# Patient Record
Sex: MALE | Race: WHITE | NOT HISPANIC OR LATINO | Employment: UNEMPLOYED | URBAN - METROPOLITAN AREA
[De-identification: names, ages, dates, MRNs, and addresses within clinical notes are randomized per-mention and may not be internally consistent; named-entity substitution may affect disease eponyms.]

---

## 2019-11-02 ENCOUNTER — CLINICAL SUPPORT (OUTPATIENT)
Dept: URGENT CARE | Facility: CLINIC | Age: 49
End: 2019-11-02
Payer: MEDICAID

## 2019-11-02 VITALS
TEMPERATURE: 99 F | RESPIRATION RATE: 16 BRPM | DIASTOLIC BLOOD PRESSURE: 78 MMHG | HEART RATE: 117 BPM | SYSTOLIC BLOOD PRESSURE: 112 MMHG | WEIGHT: 180 LBS | HEIGHT: 71 IN | BODY MASS INDEX: 25.2 KG/M2

## 2019-11-02 DIAGNOSIS — T78.40XA ALLERGIC REACTION, INITIAL ENCOUNTER: ICD-10-CM

## 2019-11-02 DIAGNOSIS — I10 HYPERTENSION, UNSPECIFIED TYPE: ICD-10-CM

## 2019-11-02 DIAGNOSIS — L03.213 PERIORBITAL CELLULITIS OF LEFT EYE: Primary | ICD-10-CM

## 2019-11-02 PROCEDURE — 99204 PR OFFICE/OUTPT VISIT, NEW, LEVL IV, 45-59 MIN: ICD-10-PCS | Mod: S$GLB,,, | Performed by: NURSE PRACTITIONER

## 2019-11-02 PROCEDURE — 99204 OFFICE O/P NEW MOD 45 MIN: CPT | Mod: S$GLB,,, | Performed by: NURSE PRACTITIONER

## 2019-11-02 RX ORDER — HYDROCHLOROTHIAZIDE 12.5 MG/1
12.5 CAPSULE ORAL DAILY
Qty: 30 CAPSULE | Refills: 1 | Status: SHIPPED | OUTPATIENT
Start: 2019-11-02 | End: 2023-01-11

## 2019-11-02 RX ORDER — CLINDAMYCIN HYDROCHLORIDE 300 MG/1
300 CAPSULE ORAL EVERY 8 HOURS
Qty: 30 CAPSULE | Refills: 0 | Status: SHIPPED | OUTPATIENT
Start: 2019-11-02 | End: 2019-11-12

## 2019-11-02 RX ORDER — CALCIPOTRIENE 50 UG/G
CREAM TOPICAL
COMMUNITY
Start: 2019-02-04

## 2019-11-02 RX ORDER — CITALOPRAM 40 MG/1
1 TABLET, FILM COATED ORAL DAILY
Status: ON HOLD | COMMUNITY
End: 2021-10-28 | Stop reason: HOSPADM

## 2019-11-02 RX ORDER — ALBUTEROL SULFATE 90 UG/1
AEROSOL, METERED RESPIRATORY (INHALATION)
COMMUNITY
Start: 2019-01-25

## 2019-11-02 RX ORDER — CLOPIDOGREL BISULFATE 75 MG/1
1 TABLET ORAL DAILY
COMMUNITY
Start: 2018-10-23

## 2019-11-02 RX ORDER — LEVETIRACETAM 1000 MG/1
2 TABLET ORAL 2 TIMES DAILY
Status: ON HOLD | COMMUNITY
Start: 2018-10-23 | End: 2021-10-28 | Stop reason: HOSPADM

## 2019-11-02 RX ORDER — CLOBETASOL PROPIONATE 0.5 MG/G
OINTMENT TOPICAL
COMMUNITY
Start: 2019-09-24

## 2019-11-02 RX ORDER — AMLODIPINE BESYLATE 10 MG/1
1 TABLET ORAL DAILY
Status: ON HOLD | COMMUNITY
End: 2021-10-28 | Stop reason: HOSPADM

## 2019-11-02 RX ORDER — ASPIRIN 81 MG/1
1 TABLET ORAL DAILY
Status: ON HOLD | COMMUNITY
Start: 2018-10-23 | End: 2021-10-28 | Stop reason: HOSPADM

## 2019-11-02 RX ORDER — LISINOPRIL 20 MG/1
1 TABLET ORAL DAILY
COMMUNITY

## 2019-11-02 RX ORDER — LORATADINE 10 MG/1
1 TABLET ORAL DAILY
COMMUNITY

## 2019-11-02 RX ORDER — DEXAMETHASONE SODIUM PHOSPHATE 4 MG/ML
8 INJECTION, SOLUTION INTRA-ARTICULAR; INTRALESIONAL; INTRAMUSCULAR; INTRAVENOUS; SOFT TISSUE
Status: COMPLETED | OUTPATIENT
Start: 2019-11-02 | End: 2019-11-02

## 2019-11-02 RX ORDER — ATORVASTATIN CALCIUM 80 MG/1
1 TABLET, FILM COATED ORAL DAILY
Status: ON HOLD | COMMUNITY
Start: 2018-10-23 | End: 2021-10-28 | Stop reason: HOSPADM

## 2019-11-02 RX ORDER — METFORMIN HYDROCHLORIDE 500 MG/1
2 TABLET ORAL 2 TIMES DAILY
Status: ON HOLD | COMMUNITY
End: 2021-10-28 | Stop reason: HOSPADM

## 2019-11-02 RX ORDER — PREDNISONE 20 MG/1
20 TABLET ORAL 2 TIMES DAILY
Qty: 10 TABLET | Refills: 0 | Status: SHIPPED | OUTPATIENT
Start: 2019-11-02 | End: 2019-11-07

## 2019-11-02 RX ADMIN — DEXAMETHASONE SODIUM PHOSPHATE 8 MG: 4 INJECTION, SOLUTION INTRA-ARTICULAR; INTRALESIONAL; INTRAMUSCULAR; INTRAVENOUS; SOFT TISSUE at 12:11

## 2019-11-02 NOTE — PATIENT INSTRUCTIONS
Angioedema  Angioedema (UJ-cbj-kh-eh-ALONZO-muh) is a sudden appearance of swollen patches (edema) on the skin or mucous membranes. It most often involves the face, lips, mouth, tongue, back of throat, or vocal cords. It may also occur in other places, such as the arms or legs. A rash may also appear during the first 4 days of this illness.  There are different types of angioedema. Your symptoms will depend on what type of angioedema you have. Swelling and redness may be the main symptoms. Like allergic reactions, angioedema may include:  · Rash, hives, redness, welts, blisters  · Itching, burning, stinging, pain  · Dry, flaky, cracking, or scaly skin  · Swelling of the face, lips, tongue, or other parts of the body  More severe symptoms may include:  · Trouble swallowing, or feeling like your throat is closing  · Trouble breathing or wheezing  · Hoarse voice or trouble speaking  · Nausea, vomiting, diarrhea, or stomach cramps  · Feeling faint or lightheaded, rapid heart rate, or low blood pressure  Angioedema can be triggered by exposure to certain substances. Medical conditions involving the immune systems and certain infections may cause it. In rare cases, angioedema can be hereditary. Sometimes the cause may be very clear. However, it is often hard to find a cause. The most common causes include:  · Foods, such as shrimp, shellfish, peanuts, milk products, gluten, and eggs; also colorings, flavorings, and additives  · Insect bites or stings, from bees, mosquitos, fleas, or ticks  · Medicines, such as ACE inhibitors, penicillin, sulfa drugs, amoxicillin, aspirin, and ibuprofen  · Latex, which may be in gloves, clothes, toys, balloons, and some kinds of tape. People who are allergic to latex may have problems with foods such as bananas, avocados, kiwi, papaya, or chestnuts.  · Stress  · Heat, cold, or sunlight  The most common cause of angioedema is a reaction to a class of medicines called ACE inhibitors. These  are used to treat high blood pressure. ACE inhibitors include captopril, enalapril, and lisinopril. Angiodema can happen even after you have been taking the medicine for some time. Tell your doctor if you have angioedema symptoms and are taking any of these medicines. Angioedema may recur. It is important to watch for the earliest signs of this condition (see the list below). Contact your healthcare provider right away if swelling involves the face, mouth, or throat.  Home care  Rest quietly today. Avoid vigorous physical activity.  Medicines: The healthcare provider may prescribe medicines for itching, swelling, or pain. Follow the healthcare providers instructions when taking these medicines.  · Oral diphenhydramine is an antihistamine available without a prescription. Unless a prescription antihistamine was given, diphenhydramine may be used to reduce widespread itching. It may make you sleepy, so be careful using it when going to school, working, or driving. (Note: Do not use diphenhydramine if you have glaucoma or if you are a man who has trouble urinating due to an enlarged prostate.) Loratadine is an antihistamine that may cause less drowsiness.  · Do not use diphenhydramine cream on your skin. Some people can have an allergic reaction to this.  · Calamine lotion or oatmeal baths sometimes help with itching.  · You may use acetaminophen or ibuprofen for pain, unless another pain medicine was prescribed.  · If you were told that your angioedema was caused by a medicine you are taking, you must stop taking it. Ask your healthcare provider for a different one. In the future, advise medical staff that you are allergic to this medicine.  · If medicine was prescribed, such as steroids or antihistamines, be sure you understand what the medicine is and how to take it.   General care  · Make sure you do not scratch areas of the body that had a reaction. This will help prevent infection.   · Stay away from air  pollution, tobacco, and wood smoke. Also stay away from cold temperatures. These things can make allergy symptoms worse.  · Try to find out what cause your reaction. Make sure to remove the allergen. Future reactions may be worse.   · If you have a serious allergy, wear a medical alert bracelet that notes this allergy.  · If the healthcare provider prescribed an epinephrine auto injector kit, keep it with you at all times.   · Tell all care providers about your allergy. Ask them h ow to use any prescribed medicines.  · Keep a record of allergies and symptoms, and when they occurred. This will help your provider treat you over time.   Follow-up care  Follow up with your healthcare provider, or as advised. You may need to see an allergist. An allergist can help find the cause of an allergic reaction and give recommendations on how to prevent future reactions.  Call 911  Contact emergency services right away if any of these occur:  · Trouble breathing or swallowing, or wheezing  · Hoarse voice or trouble speaking, or drooling  · Chest pain or tightness  · Confusion, lightheadedness, or dizziness  · Extreme drowsiness or trouble awakening  · Fainting or loss of consciousness  · Rapid heart rate  · Vomiting blood, or large amounts of blood in stool  · Seizure  · Nausea, vomiting, diarrhea, abdominal pain, or stomach cramps  When to seek medical attention  Call your healthcare provider right away if any of the following occur:  · Symptoms don't go away  · Symptoms come back  · Symptoms get worse or new symptoms develop  · Hives feel uncomfortable  · Fever of 100.4°F (38°C), or as directed by your healthcare professional  Date Last Reviewed: 5/1/2017  © 7051-4073 [employer]. [address] [place] [address], [place] [address]. All rights reserved. This information is not intended as a substitute for professional medical care. Always follow your healthcare professional's instructions.        Facial  Cellulitis  Cellulitis is an infection of the deep layers of skin. A break in the skin, such as a cut or scratch, can let bacteria under the skin. It may also occur from an infected oil gland (pimple) or hair follicle. If the bacteria get to deep layers of the skin, it can be serious. If not treated, cellulitis can get into the bloodstream and lymph nodes. The infection can then spread throughout the body. This causes serious illness.  Cellulitis causes the affected skin to become red, swollen, warm, and sore. The reddened areas have a visible border. You may have a fever, chills, and pain.  Cellulitis is treated with antibiotics taken for 7 to 10 days. Symptoms should get better 1 to 2 days after treatment is started. Make sure to take all the antibiotics for the full number of days until they are gone. Keep taking the medication even if your symptoms go away.  Home care  Follow these tips:  · Take all of the antibiotic medicine exactly as directed until it is gone. Dont miss any doses, especially during the first 7 days. Dont stop taking it when your symptoms get better.  · Use a cool compress (face cloth soaked in cool water) on your face to help reduce swelling and pain.  · You may use acetaminophen or ibuprofen to reduce pain. Dont use these if you have chronic liver or kidney disease, or ever had a stomach ulcer or gastrointestinal bleeding. Talk with your healthcare provider first.  Follow-up care  Follow up with your healthcare provider, or as advised. If your infection does not go away on the first antibiotic, your healthcare provider will prescribe a different one.  When to seek medical advice  Call your healthcare provider right away if any of these occur:  · Fever higher of 100.4º F (38.0º C) or higher after 2 days on antibiotics  · Red areas that spread  · Swelling or pain that gets worse  · Fluid leaking from the skin (pus)  · An eyelid that swells shut or leaks fluid (pus)  · Headache or neck pain  that gets worse  · Unusual drowsiness or confusion  · Convulsions (seizure)  · Change in eyesight     Date Last Reviewed: 9/1/2016  © 3290-4741 [employer]. [address] [place] [address], [place] [address]. All rights reserved. This information is not intended as a substitute for professional medical care. Always follow your healthcare professional's instructions.      STOP TAKING LISINOPRIL

## 2019-11-02 NOTE — PROGRESS NOTES
"Subjective:       Patient ID: Brannon Severino is a 49 y.o. male.    Vitals:  height is 5' 11" (1.803 m) and weight is 81.6 kg (180 lb). His oral temperature is 98.8 °F (37.1 °C). His blood pressure is 112/78 and his pulse is 117 (abnormal). His respiration is 16.     Chief Complaint: Belepharitis (under left eye swollen X 2 days, denies any trauma, visual changes, hurts some if you touch )    Mr. Severino presents today with complaints of left periorbital swelling. It is moderate. It is associated with itching. He denies pain, visual disturbance, or known contacts with allergens. He does take lisinopril.      Constitution: Negative for chills and fever.   HENT: Negative for congestion and sinus pain.    Eyes: Positive for eyelid swelling. Negative for eye trauma, foreign body in eye, eye discharge, eye itching, eye pain, eye redness, photophobia, vision loss, double vision and blurred vision.   Gastrointestinal: Negative for nausea and vomiting.   Skin: Negative for rash.   Allergic/Immunologic: Negative for seasonal allergies and itching.   Neurological: Negative for headaches.       Objective:      Physical Exam   Constitutional: He is oriented to person, place, and time. He appears well-developed and well-nourished.   HENT:   Head: Normocephalic and atraumatic.   Right Ear: External ear normal.   Left Ear: External ear normal.   Nose: Nose normal.   Mouth/Throat: Oropharynx is clear and moist.   Eyes: Pupils are equal, round, and reactive to light. Conjunctivae, EOM and lids are normal.   Left eyelid slight swelling under eye with significant swelling   Neck: Trachea normal, full passive range of motion without pain and phonation normal. Neck supple.   Musculoskeletal: Normal range of motion.   Neurological: He is alert and oriented to person, place, and time.   Skin: Skin is warm, dry and intact. Capillary refill takes less than 2 seconds.   Psychiatric: He has a normal mood and affect. His speech is normal " and behavior is normal. Judgment and thought content normal. Cognition and memory are normal.   Nursing note and vitals reviewed.        Assessment:       1. Periorbital cellulitis of left eye    2. Hypertension, unspecified type    3. Allergic reaction, initial encounter        Plan:         Periorbital cellulitis of left eye  -     clindamycin (CLEOCIN) 300 MG capsule; Take 1 capsule (300 mg total) by mouth every 8 (eight) hours. for 10 days  Dispense: 30 capsule; Refill: 0    Hypertension, unspecified type  -     hydroCHLOROthiazide (MICROZIDE) 12.5 mg capsule; Take 1 capsule (12.5 mg total) by mouth once daily.  Dispense: 30 capsule; Refill: 1    Allergic reaction, initial encounter  -     dexamethasone injection 8 mg  -     predniSONE (DELTASONE) 20 MG tablet; Take 1 tablet (20 mg total) by mouth 2 (two) times daily. for 5 days  Dispense: 10 tablet; Refill: 0       He should stop taking lisinopril until he follows up with his PCP

## 2020-09-25 ENCOUNTER — TELEPHONE (OUTPATIENT)
Dept: OPTOMETRY | Facility: CLINIC | Age: 50
End: 2020-09-25

## 2020-09-25 NOTE — TELEPHONE ENCOUNTER
----- Message from Aleida Gonzalez sent at 9/24/2020  6:08 PM CDT -----  Regarding: Appointment request  Appointment  Request      Who called:Patient's sister Suze Patel  Reason for visit:eye exam  New or Existing Patient:new  Callback or Josechannalee response:callback  Best contact number:[phone]  Additional information:has medicaid

## 2020-09-25 NOTE — TELEPHONE ENCOUNTER
LVM for pt that we are out of network for vision exams under Medicaid insurance.  Pt to call back if would still like internal dilated exam only.

## 2020-11-10 ENCOUNTER — OFFICE VISIT (OUTPATIENT)
Dept: URGENT CARE | Facility: CLINIC | Age: 50
End: 2020-11-10
Payer: MEDICAID

## 2020-11-10 VITALS
SYSTOLIC BLOOD PRESSURE: 116 MMHG | OXYGEN SATURATION: 98 % | WEIGHT: 162 LBS | HEART RATE: 99 BPM | HEIGHT: 71 IN | TEMPERATURE: 98 F | DIASTOLIC BLOOD PRESSURE: 83 MMHG | RESPIRATION RATE: 18 BRPM | BODY MASS INDEX: 22.68 KG/M2

## 2020-11-10 DIAGNOSIS — T78.3XXA ANGIOEDEMA OF LIPS, INITIAL ENCOUNTER: Primary | ICD-10-CM

## 2020-11-10 DIAGNOSIS — E11.9 TYPE 2 DIABETES MELLITUS WITHOUT COMPLICATION, WITHOUT LONG-TERM CURRENT USE OF INSULIN: ICD-10-CM

## 2020-11-10 DIAGNOSIS — T78.40XA ALLERGIC REACTION TO DRUG, INITIAL ENCOUNTER: ICD-10-CM

## 2020-11-10 LAB — GLUCOSE SERPL-MCNC: 90 MG/DL (ref 70–110)

## 2020-11-10 PROCEDURE — 99214 PR OFFICE/OUTPT VISIT, EST, LEVL IV, 30-39 MIN: ICD-10-PCS | Mod: S$GLB,,, | Performed by: NURSE PRACTITIONER

## 2020-11-10 PROCEDURE — 99214 OFFICE O/P EST MOD 30 MIN: CPT | Mod: S$GLB,,, | Performed by: NURSE PRACTITIONER

## 2020-11-10 PROCEDURE — 82962 GLUCOSE BLOOD TEST: CPT | Mod: ,,, | Performed by: NURSE PRACTITIONER

## 2020-11-10 PROCEDURE — 82962 POCT GLUCOSE, HAND-HELD DEVICE: ICD-10-PCS | Mod: ,,, | Performed by: NURSE PRACTITIONER

## 2020-11-10 RX ORDER — FAMOTIDINE 20 MG/1
20 TABLET, FILM COATED ORAL 2 TIMES DAILY
Qty: 20 TABLET | Refills: 0 | Status: SHIPPED | OUTPATIENT
Start: 2020-11-10 | End: 2023-01-11

## 2020-11-10 RX ORDER — PREDNISONE 20 MG/1
40 TABLET ORAL DAILY
Qty: 10 TABLET | Refills: 0 | Status: SHIPPED | OUTPATIENT
Start: 2020-11-10 | End: 2020-11-15

## 2020-11-10 RX ORDER — DIPHENHYDRAMINE HYDROCHLORIDE 50 MG/ML
25 INJECTION INTRAMUSCULAR; INTRAVENOUS
Status: COMPLETED | OUTPATIENT
Start: 2020-11-10 | End: 2020-11-10

## 2020-11-10 RX ORDER — DEXAMETHASONE SODIUM PHOSPHATE 4 MG/ML
8 INJECTION, SOLUTION INTRA-ARTICULAR; INTRALESIONAL; INTRAMUSCULAR; INTRAVENOUS; SOFT TISSUE
Status: COMPLETED | OUTPATIENT
Start: 2020-11-10 | End: 2020-11-10

## 2020-11-10 RX ORDER — DIPHENHYDRAMINE HCL 50 MG
50 CAPSULE ORAL EVERY 6 HOURS PRN
Qty: 30 CAPSULE | Refills: 0 | Status: SHIPPED | OUTPATIENT
Start: 2020-11-10

## 2020-11-10 RX ORDER — DIPHENHYDRAMINE HCL 25 MG
25 TABLET ORAL
Status: DISCONTINUED | OUTPATIENT
Start: 2020-11-10 | End: 2020-11-10

## 2020-11-10 RX ADMIN — DEXAMETHASONE SODIUM PHOSPHATE 8 MG: 4 INJECTION, SOLUTION INTRA-ARTICULAR; INTRALESIONAL; INTRAMUSCULAR; INTRAVENOUS; SOFT TISSUE at 10:11

## 2020-11-10 RX ADMIN — DIPHENHYDRAMINE HYDROCHLORIDE 25 MG: 50 INJECTION INTRAMUSCULAR; INTRAVENOUS at 10:11

## 2020-11-10 NOTE — PROGRESS NOTES
"Subjective:       Patient ID: Brannon Severino is a 50 y.o. male.    Vitals:  height is 5' 11" (1.803 m) and weight is 73.5 kg (162 lb). His oral temperature is 97.6 °F (36.4 °C). His blood pressure is 116/83 and his pulse is 99. His respiration is 18 and oxygen saturation is 98%.     Chief Complaint: Oral Swelling    Patient reports he woke up with swelling to his upper lip. Denies sob, difficulty breathing/swallowing, wheezing. Patient is taking Lisinopril.       Constitution: Negative for chills, fatigue and fever.   HENT: Negative for congestion and sore throat.         Upper lip swelling   Neck: Negative for painful lymph nodes.   Cardiovascular: Negative for chest pain and leg swelling.   Eyes: Negative for double vision and blurred vision.   Respiratory: Negative for cough and shortness of breath.    Gastrointestinal: Negative for nausea, vomiting and diarrhea.   Genitourinary: Negative for dysuria, frequency and urgency.   Musculoskeletal: Negative for joint pain, joint swelling, muscle cramps and muscle ache.   Skin: Negative for color change, pale and rash.   Allergic/Immunologic: Negative for seasonal allergies.   Neurological: Negative for dizziness, history of vertigo, light-headedness, passing out and headaches.   Hematologic/Lymphatic: Negative for swollen lymph nodes, easy bruising/bleeding and history of blood clots. Does not bruise/bleed easily.   Psychiatric/Behavioral: Negative for nervous/anxious, sleep disturbance and depression. The patient is not nervous/anxious.        Objective:      Physical Exam   Constitutional: He is oriented to person, place, and time. He appears well-developed. He is cooperative.  Non-toxic appearance. He does not appear ill. No distress.   HENT:   Head: Normocephalic and atraumatic.   Ears:   Right Ear: Hearing, tympanic membrane, external ear and ear canal normal.   Left Ear: Hearing, tympanic membrane, external ear and ear canal normal.   Nose: Nose normal. No " mucosal edema, rhinorrhea or nasal deformity. No epistaxis. Right sinus exhibits no maxillary sinus tenderness and no frontal sinus tenderness. Left sinus exhibits no maxillary sinus tenderness and no frontal sinus tenderness.   Mouth/Throat: Uvula is midline, oropharynx is clear and moist and mucous membranes are normal. No trismus in the jaw. Normal dentition. No uvula swelling. No posterior oropharyngeal erythema.   Severe upper lip swelling. Airway patent. No drooling. Speaking in clear sentences. No difficulty speaking or swallowing.       Comments: Severe upper lip swelling. Airway patent. No drooling. Speaking in clear sentences. No difficulty speaking or swallowing.   Eyes: Conjunctivae and lids are normal. Right eye exhibits no discharge. Left eye exhibits no discharge. No scleral icterus.   Neck: Trachea normal, normal range of motion, full passive range of motion without pain and phonation normal. Neck supple.   Cardiovascular: Normal rate, regular rhythm, normal heart sounds and normal pulses.   Pulmonary/Chest: Effort normal and breath sounds normal. No stridor. No respiratory distress. He has no decreased breath sounds. He has no wheezes. He has no rhonchi. He has no rales.   Abdominal: Soft. Normal appearance and bowel sounds are normal. He exhibits no distension, no pulsatile midline mass and no mass. There is no abdominal tenderness.   Musculoskeletal: Normal range of motion.         General: No deformity.   Neurological: He is alert and oriented to person, place, and time. He exhibits normal muscle tone. Coordination normal.   Skin: Skin is warm, dry, intact, not diaphoretic and not pale. Psychiatric: His speech is normal and behavior is normal. Judgment and thought content normal.   Nursing note and vitals reviewed.        Assessment:       1. Angioedema of lips, initial encounter    2. Allergic reaction to drug, initial encounter    3. Type 2 diabetes mellitus without complication, without  long-term current use of insulin        Plan:       CBG = 90    Decadron 8mg and Benadryl 25 mg IM given in clinic.      Advised patient :   STOP TAKING LISINOPRIL!! If you begin having difficulty breathing, swallowing, hoarse voice, drooling, trouble speaking, wheezing, shortness of breath or any other symptoms go immediately to the ER. Monitor your blood sugar closely for the next few days since you received a steroid injection today and steroids can cause your blood sugar to go up. Follow up with PCP and let him know that you are no longer taking Lisinopril       Angioedema of lips, initial encounter    Allergic reaction to drug, initial encounter    Type 2 diabetes mellitus without complication, without long-term current use of insulin  -     POCT Glucose, Hand-Held Device    Other orders  -     famotidine (PEPCID) 20 MG tablet; Take 1 tablet (20 mg total) by mouth 2 (two) times daily. for 10 days  Dispense: 20 tablet; Refill: 0  -     predniSONE (DELTASONE) 20 MG tablet; Take 2 tablets (40 mg total) by mouth once daily. for 5 days  Dispense: 10 tablet; Refill: 0  -     diphenhydrAMINE (BENADRYL) 50 MG capsule; Take 1 capsule (50 mg total) by mouth every 6 (six) hours as needed for Itching.  Dispense: 30 capsule; Refill: 0  -     diphenhydrAMINE tablet 25 mg  -     dexamethasone injection 8 mg

## 2020-11-10 NOTE — PATIENT INSTRUCTIONS
STOP TAKING LISINOPRIL!! If you begin having difficulty breathing, swallowing, hoarse voice, drooling, trouble speaking, wheezing, shortness of breath or any other symptoms go immediately to the ER.     Angioedema  Angioedema (BN-[id number]-eh-ALONZO-muh) is a sudden appearance of swollen patches (edema) on the skin or mucous membranes. It most often involves the face, lips, mouth, tongue, back of throat, or vocal cords. It may also occur in other places, such as the arms or legs. A rash may also appear during the first 4 days of this illness.  There are different types of angioedema. Your symptoms will depend on what type of angioedema you have. Swelling and redness may be the main symptoms. Like allergic reactions, angioedema may include:  · Rash, hives, redness, welts, blisters  · Itching, burning, stinging, pain  · Dry, flaky, cracking, or scaly skin  · Swelling of the face, lips, tongue, or other parts of the body  More severe symptoms may include:  · Trouble swallowing, or feeling like your throat is closing  · Trouble breathing or wheezing  · Hoarse voice or trouble speaking  · Nausea, vomiting, diarrhea, or stomach cramps  · Feeling faint or lightheaded, rapid heart rate, or low blood pressure  Angioedema can be triggered by exposure to certain substances. Medical conditions involving the immune systems and certain infections may cause it. In rare cases, angioedema can be hereditary. Sometimes the cause may be very clear. However, it is often hard to find a cause. The most common causes include:  · Foods, such as shrimp, shellfish, peanuts, milk products, gluten, and eggs; also colorings, flavorings, and additives  · Insect bites or stings, from bees, mosquitos, fleas, or ticks  · Medicines, such as ACE inhibitors, penicillin, sulfa drugs, amoxicillin, aspirin, and ibuprofen  · Latex, which may be in gloves, clothes, toys, balloons, and some kinds of tape. People who are allergic to latex may have problems with  foods such as bananas, avocados, kiwi, papaya, or chestnuts.  · Stress  · Heat, cold, or sunlight  The most common cause of angioedema is a reaction to a class of medicines called ACE inhibitors. These are used to treat high blood pressure. ACE inhibitors include captopril, enalapril, and lisinopril. Angiodema can happen even after you have been taking the medicine for some time. Tell your doctor if you have angioedema symptoms and are taking any of these medicines. Angioedema may recur. It is important to watch for the earliest signs of this condition (see the list below). Contact your healthcare provider right away if swelling involves the face, mouth, or throat.  Home care  Rest quietly today. Avoid vigorous physical activity.  Medicines: The healthcare provider may prescribe medicines for itching, swelling, or pain. Follow the healthcare providers instructions when taking these medicines.  · Oral diphenhydramine is an antihistamine available without a prescription. Unless a prescription antihistamine was given, diphenhydramine may be used to reduce widespread itching. It may make you sleepy, so be careful using it when going to school, working, or driving. (Note: Do not use diphenhydramine if you have glaucoma or if you are a man who has trouble urinating due to an enlarged prostate.) Loratadine is an antihistamine that may cause less drowsiness.  · Do not use diphenhydramine cream on your skin. Some people can have an allergic reaction to this.  · Calamine lotion or oatmeal baths sometimes help with itching.  · You may use acetaminophen or ibuprofen for pain, unless another pain medicine was prescribed.  · If you were told that your angioedema was caused by a medicine you are taking, you must stop taking it. Ask your healthcare provider for a different one. In the future, advise medical staff that you are allergic to this medicine.  · If medicine was prescribed, such as steroids or antihistamines, be sure you  understand what the medicine is and how to take it.   General care  · Make sure you do not scratch areas of the body that had a reaction. This will help prevent infection.   · Stay away from air pollution, tobacco, and wood smoke. Also stay away from cold temperatures. These things can make allergy symptoms worse.  · Try to find out what cause your reaction. Make sure to remove the allergen. Future reactions may be worse.   · If you have a serious allergy, wear a medical alert bracelet that notes this allergy.  · If the healthcare provider prescribed an epinephrine auto injector kit, keep it with you at all times.   · Tell all care providers about your allergy. Ask them h ow to use any prescribed medicines.  · Keep a record of allergies and symptoms, and when they occurred. This will help your provider treat you over time.   Follow-up care  Follow up with your healthcare provider, or as advised. You may need to see an allergist. An allergist can help find the cause of an allergic reaction and give recommendations on how to prevent future reactions.  Call 911  Contact emergency services right away if any of these occur:  · Trouble breathing or swallowing, or wheezing  · Hoarse voice or trouble speaking, or drooling  · Chest pain or tightness  · Confusion, lightheadedness, or dizziness  · Extreme drowsiness or trouble awakening  · Fainting or loss of consciousness  · Rapid heart rate  · Vomiting blood, or large amounts of blood in stool  · Seizure  · Nausea, vomiting, diarrhea, abdominal pain, or stomach cramps  When to seek medical attention  Call your healthcare provider right away if any of the following occur:  · Symptoms don't go away  · Symptoms come back  · Symptoms get worse or new symptoms develop  · Hives feel uncomfortable  · Fever of 100.4°F (38°C), or as directed by your healthcare professional  Date Last Reviewed: 5/1/2017  © 9063-0058 [employer] [address] [place] [address], [place] [address].  All rights reserved. This information is not intended as a substitute for professional medical care. Always follow your healthcare professional's instructions.

## 2021-05-10 ENCOUNTER — PATIENT MESSAGE (OUTPATIENT)
Dept: RESEARCH | Facility: HOSPITAL | Age: 51
End: 2021-05-10

## 2021-05-20 ENCOUNTER — TELEPHONE (OUTPATIENT)
Dept: SURGERY | Facility: CLINIC | Age: 51
End: 2021-05-20

## 2021-05-21 ENCOUNTER — TELEPHONE (OUTPATIENT)
Dept: SURGERY | Facility: CLINIC | Age: 51
End: 2021-05-21

## 2021-05-21 ENCOUNTER — DOCUMENTATION ONLY (OUTPATIENT)
Dept: SURGERY | Facility: CLINIC | Age: 51
End: 2021-05-21

## 2021-05-21 DIAGNOSIS — Z12.11 SCREENING FOR COLON CANCER: Primary | ICD-10-CM

## 2021-05-21 RX ORDER — SODIUM CHLORIDE 0.9 % (FLUSH) 0.9 %
3 SYRINGE (ML) INJECTION
Status: CANCELLED | OUTPATIENT
Start: 2021-05-21

## 2021-05-21 RX ORDER — SODIUM, POTASSIUM,MAG SULFATES 17.5-3.13G
1 SOLUTION, RECONSTITUTED, ORAL ORAL DAILY
Qty: 1 KIT | Refills: 0 | Status: SHIPPED | OUTPATIENT
Start: 2021-05-21 | End: 2021-05-23

## 2021-05-21 RX ORDER — SODIUM CHLORIDE, SODIUM LACTATE, POTASSIUM CHLORIDE, CALCIUM CHLORIDE 600; 310; 30; 20 MG/100ML; MG/100ML; MG/100ML; MG/100ML
INJECTION, SOLUTION INTRAVENOUS CONTINUOUS
Status: CANCELLED | OUTPATIENT
Start: 2021-05-21

## 2021-05-24 ENCOUNTER — TELEPHONE (OUTPATIENT)
Dept: SURGERY | Facility: CLINIC | Age: 51
End: 2021-05-24

## 2021-05-27 ENCOUNTER — HOSPITAL ENCOUNTER (EMERGENCY)
Facility: HOSPITAL | Age: 51
Discharge: PSYCHIATRIC OR SUBSTANCE USE FACILITY | End: 2021-05-28
Attending: EMERGENCY MEDICINE
Payer: MEDICAID

## 2021-05-27 DIAGNOSIS — R45.1 AGITATION: Primary | ICD-10-CM

## 2021-05-27 LAB
ALBUMIN SERPL BCP-MCNC: 4.1 G/DL (ref 3.5–5.2)
ALP SERPL-CCNC: 143 U/L (ref 55–135)
ALT SERPL W/O P-5'-P-CCNC: 13 U/L (ref 10–44)
AMPHET+METHAMPHET UR QL: NEGATIVE
ANION GAP SERPL CALC-SCNC: 14 MMOL/L (ref 8–16)
APAP SERPL-MCNC: <3 UG/ML (ref 10–20)
AST SERPL-CCNC: 25 U/L (ref 10–40)
BARBITURATES UR QL SCN>200 NG/ML: NEGATIVE
BASOPHILS # BLD AUTO: 0.06 K/UL (ref 0–0.2)
BASOPHILS NFR BLD: 0.7 % (ref 0–1.9)
BENZODIAZ UR QL SCN>200 NG/ML: NEGATIVE
BILIRUB SERPL-MCNC: 1.2 MG/DL (ref 0.1–1)
BILIRUB UR QL STRIP: NEGATIVE
BUN SERPL-MCNC: 8 MG/DL (ref 6–20)
BZE UR QL SCN: NEGATIVE
CALCIUM SERPL-MCNC: 9.8 MG/DL (ref 8.7–10.5)
CANNABINOIDS UR QL SCN: NEGATIVE
CHLORIDE SERPL-SCNC: 98 MMOL/L (ref 95–110)
CLARITY UR: CLEAR
CO2 SERPL-SCNC: 22 MMOL/L (ref 23–29)
COLOR UR: YELLOW
CREAT SERPL-MCNC: 0.8 MG/DL (ref 0.5–1.4)
CREAT UR-MCNC: 52.4 MG/DL (ref 23–375)
DIFFERENTIAL METHOD: ABNORMAL
EOSINOPHIL # BLD AUTO: 0.1 K/UL (ref 0–0.5)
EOSINOPHIL NFR BLD: 0.6 % (ref 0–8)
ERYTHROCYTE [DISTWIDTH] IN BLOOD BY AUTOMATED COUNT: 13.7 % (ref 11.5–14.5)
EST. GFR  (AFRICAN AMERICAN): >60 ML/MIN/1.73 M^2
EST. GFR  (NON AFRICAN AMERICAN): >60 ML/MIN/1.73 M^2
ETHANOL SERPL-MCNC: <10 MG/DL
GLUCOSE SERPL-MCNC: 117 MG/DL (ref 70–110)
GLUCOSE UR QL STRIP: NEGATIVE
HCT VFR BLD AUTO: 38.5 % (ref 40–54)
HGB BLD-MCNC: 12.8 G/DL (ref 14–18)
HGB UR QL STRIP: ABNORMAL
IMM GRANULOCYTES # BLD AUTO: 0.03 K/UL (ref 0–0.04)
IMM GRANULOCYTES NFR BLD AUTO: 0.3 % (ref 0–0.5)
KETONES UR QL STRIP: NEGATIVE
LEUKOCYTE ESTERASE UR QL STRIP: NEGATIVE
LYMPHOCYTES # BLD AUTO: 1.2 K/UL (ref 1–4.8)
LYMPHOCYTES NFR BLD: 13.7 % (ref 18–48)
MCH RBC QN AUTO: 30.3 PG (ref 27–31)
MCHC RBC AUTO-ENTMCNC: 33.2 G/DL (ref 32–36)
MCV RBC AUTO: 91 FL (ref 82–98)
METHADONE UR QL SCN>300 NG/ML: NEGATIVE
MONOCYTES # BLD AUTO: 0.9 K/UL (ref 0.3–1)
MONOCYTES NFR BLD: 9.7 % (ref 4–15)
NEUTROPHILS # BLD AUTO: 6.8 K/UL (ref 1.8–7.7)
NEUTROPHILS NFR BLD: 75 % (ref 38–73)
NITRITE UR QL STRIP: NEGATIVE
NRBC BLD-RTO: 0 /100 WBC
OPIATES UR QL SCN: NEGATIVE
PCP UR QL SCN>25 NG/ML: NEGATIVE
PH UR STRIP: 6 [PH] (ref 5–8)
PLATELET # BLD AUTO: 400 K/UL (ref 150–450)
PMV BLD AUTO: 9 FL (ref 9.2–12.9)
POTASSIUM SERPL-SCNC: 4.4 MMOL/L (ref 3.5–5.1)
PROT SERPL-MCNC: 7.8 G/DL (ref 6–8.4)
PROT UR QL STRIP: NEGATIVE
RBC # BLD AUTO: 4.22 M/UL (ref 4.6–6.2)
SARS-COV-2 RDRP RESP QL NAA+PROBE: NEGATIVE
SODIUM SERPL-SCNC: 134 MMOL/L (ref 136–145)
SP GR UR STRIP: 1.01 (ref 1–1.03)
TOXICOLOGY INFORMATION: NORMAL
TSH SERPL DL<=0.005 MIU/L-ACNC: 0.63 UIU/ML (ref 0.4–4)
URN SPEC COLLECT METH UR: ABNORMAL
UROBILINOGEN UR STRIP-ACNC: NEGATIVE EU/DL
WBC # BLD AUTO: 9.05 K/UL (ref 3.9–12.7)

## 2021-05-27 PROCEDURE — 81003 URINALYSIS AUTO W/O SCOPE: CPT | Mod: 59 | Performed by: EMERGENCY MEDICINE

## 2021-05-27 PROCEDURE — 36415 COLL VENOUS BLD VENIPUNCTURE: CPT | Performed by: EMERGENCY MEDICINE

## 2021-05-27 PROCEDURE — 85025 COMPLETE CBC W/AUTO DIFF WBC: CPT | Performed by: EMERGENCY MEDICINE

## 2021-05-27 PROCEDURE — 99285 EMERGENCY DEPT VISIT HI MDM: CPT

## 2021-05-27 PROCEDURE — 80307 DRUG TEST PRSMV CHEM ANLYZR: CPT | Performed by: EMERGENCY MEDICINE

## 2021-05-27 PROCEDURE — 82077 ASSAY SPEC XCP UR&BREATH IA: CPT | Performed by: EMERGENCY MEDICINE

## 2021-05-27 PROCEDURE — U0002 COVID-19 LAB TEST NON-CDC: HCPCS | Performed by: EMERGENCY MEDICINE

## 2021-05-27 PROCEDURE — 80053 COMPREHEN METABOLIC PANEL: CPT | Performed by: EMERGENCY MEDICINE

## 2021-05-27 PROCEDURE — 25000003 PHARM REV CODE 250: Performed by: EMERGENCY MEDICINE

## 2021-05-27 PROCEDURE — 80143 DRUG ASSAY ACETAMINOPHEN: CPT | Performed by: EMERGENCY MEDICINE

## 2021-05-27 PROCEDURE — 84443 ASSAY THYROID STIM HORMONE: CPT | Performed by: EMERGENCY MEDICINE

## 2021-05-27 RX ORDER — LEVETIRACETAM 500 MG/1
1000 TABLET ORAL ONCE
Status: COMPLETED | OUTPATIENT
Start: 2021-05-27 | End: 2021-05-27

## 2021-05-27 RX ORDER — LEVETIRACETAM 500 MG/1
1000 TABLET ORAL 2 TIMES DAILY
Status: DISCONTINUED | OUTPATIENT
Start: 2021-05-27 | End: 2021-05-28 | Stop reason: HOSPADM

## 2021-05-27 RX ADMIN — LEVETIRACETAM 1000 MG: 500 TABLET, FILM COATED ORAL at 08:05

## 2021-05-28 VITALS
OXYGEN SATURATION: 98 % | RESPIRATION RATE: 16 BRPM | TEMPERATURE: 98 F | BODY MASS INDEX: 23.92 KG/M2 | HEART RATE: 88 BPM | SYSTOLIC BLOOD PRESSURE: 143 MMHG | WEIGHT: 170.88 LBS | DIASTOLIC BLOOD PRESSURE: 88 MMHG | HEIGHT: 71 IN

## 2021-05-28 PROCEDURE — 99204 OFFICE O/P NEW MOD 45 MIN: CPT | Mod: 95,AF,HB, | Performed by: PSYCHIATRY & NEUROLOGY

## 2021-05-28 PROCEDURE — 99204 PR OFFICE/OUTPT VISIT, NEW, LEVL IV, 45-59 MIN: ICD-10-PCS | Mod: 95,AF,HB, | Performed by: PSYCHIATRY & NEUROLOGY

## 2021-05-28 PROCEDURE — 25000003 PHARM REV CODE 250: Performed by: EMERGENCY MEDICINE

## 2021-05-28 RX ORDER — LORAZEPAM 1 MG/1
1 TABLET ORAL
Status: COMPLETED | OUTPATIENT
Start: 2021-05-28 | End: 2021-05-28

## 2021-05-28 RX ADMIN — LORAZEPAM 1 MG: 1 TABLET ORAL at 02:05

## 2021-06-07 ENCOUNTER — DOCUMENTATION ONLY (OUTPATIENT)
Dept: SURGERY | Facility: CLINIC | Age: 51
End: 2021-06-07
Payer: MEDICAID

## 2021-07-19 ENCOUNTER — LAB VISIT (OUTPATIENT)
Dept: LAB | Facility: HOSPITAL | Age: 51
End: 2021-07-19
Attending: INTERNAL MEDICINE
Payer: MEDICAID

## 2021-07-19 DIAGNOSIS — Z11.1 SCREENING EXAMINATION FOR PULMONARY TUBERCULOSIS: Primary | ICD-10-CM

## 2021-07-19 PROCEDURE — 36415 COLL VENOUS BLD VENIPUNCTURE: CPT | Performed by: INTERNAL MEDICINE

## 2021-07-19 PROCEDURE — 86480 TB TEST CELL IMMUN MEASURE: CPT | Performed by: INTERNAL MEDICINE

## 2021-07-21 LAB
GAMMA INTERFERON BACKGROUND BLD IA-ACNC: 0.02 IU/ML
M TB IFN-G CD4+ BCKGRND COR BLD-ACNC: 0.03 IU/ML
MITOGEN IGNF BCKGRD COR BLD-ACNC: 8.96 IU/ML
TB GOLD PLUS: NEGATIVE
TB2 - NIL: 0.02 IU/ML

## 2021-10-22 PROBLEM — R45.851 SUICIDE IDEATION: Status: ACTIVE | Noted: 2021-10-22

## 2021-11-04 ENCOUNTER — HOSPITAL ENCOUNTER (EMERGENCY)
Facility: HOSPITAL | Age: 51
Discharge: HOME | End: 2021-11-04
Attending: EMERGENCY MEDICINE
Payer: MEDICAID

## 2021-11-04 VITALS
RESPIRATION RATE: 18 BRPM | DIASTOLIC BLOOD PRESSURE: 83 MMHG | HEIGHT: 71 IN | HEART RATE: 78 BPM | OXYGEN SATURATION: 98 % | TEMPERATURE: 98 F | BODY MASS INDEX: 23.8 KG/M2 | SYSTOLIC BLOOD PRESSURE: 150 MMHG | WEIGHT: 170 LBS

## 2021-11-04 DIAGNOSIS — S01.81XA LACERATION OF FOREHEAD, INITIAL ENCOUNTER: Primary | ICD-10-CM

## 2021-11-04 DIAGNOSIS — F10.920 ALCOHOLIC INTOXICATION WITHOUT COMPLICATION: ICD-10-CM

## 2021-11-04 LAB
ALBUMIN SERPL BCP-MCNC: 3.7 G/DL (ref 3.5–5.2)
ALP SERPL-CCNC: 108 U/L (ref 55–135)
ALT SERPL W/O P-5'-P-CCNC: 19 U/L (ref 10–44)
AMPHET+METHAMPHET UR QL: NEGATIVE
ANION GAP SERPL CALC-SCNC: 17 MMOL/L (ref 8–16)
APAP SERPL-MCNC: <3 UG/ML (ref 10–20)
AST SERPL-CCNC: 26 U/L (ref 10–40)
BACTERIA #/AREA URNS HPF: NORMAL /HPF
BARBITURATES UR QL SCN>200 NG/ML: NEGATIVE
BASOPHILS # BLD AUTO: 0.11 K/UL (ref 0–0.2)
BASOPHILS NFR BLD: 1.2 % (ref 0–1.9)
BENZODIAZ UR QL SCN>200 NG/ML: ABNORMAL
BILIRUB SERPL-MCNC: 0.3 MG/DL (ref 0.1–1)
BILIRUB UR QL STRIP: NEGATIVE
BUN SERPL-MCNC: 6 MG/DL (ref 6–20)
BZE UR QL SCN: NEGATIVE
CALCIUM SERPL-MCNC: 9.5 MG/DL (ref 8.7–10.5)
CANNABINOIDS UR QL SCN: NEGATIVE
CHLORIDE SERPL-SCNC: 96 MMOL/L (ref 95–110)
CLARITY UR: CLEAR
CO2 SERPL-SCNC: 21 MMOL/L (ref 23–29)
COLOR UR: YELLOW
CREAT SERPL-MCNC: 0.8 MG/DL (ref 0.5–1.4)
CREAT UR-MCNC: 50.7 MG/DL (ref 23–375)
DIFFERENTIAL METHOD: ABNORMAL
EOSINOPHIL # BLD AUTO: 0.3 K/UL (ref 0–0.5)
EOSINOPHIL NFR BLD: 3.5 % (ref 0–8)
ERYTHROCYTE [DISTWIDTH] IN BLOOD BY AUTOMATED COUNT: 14.6 % (ref 11.5–14.5)
EST. GFR  (AFRICAN AMERICAN): >60 ML/MIN/1.73 M^2
EST. GFR  (NON AFRICAN AMERICAN): >60 ML/MIN/1.73 M^2
ETHANOL SERPL-MCNC: 279 MG/DL
GLUCOSE SERPL-MCNC: 85 MG/DL (ref 70–110)
GLUCOSE UR QL STRIP: NEGATIVE
HCT VFR BLD AUTO: 37.2 % (ref 40–54)
HGB BLD-MCNC: 12.3 G/DL (ref 14–18)
HGB UR QL STRIP: ABNORMAL
HYALINE CASTS #/AREA URNS LPF: 0 /LPF
IMM GRANULOCYTES # BLD AUTO: 0.03 K/UL (ref 0–0.04)
IMM GRANULOCYTES NFR BLD AUTO: 0.3 % (ref 0–0.5)
KETONES UR QL STRIP: NEGATIVE
LEUKOCYTE ESTERASE UR QL STRIP: NEGATIVE
LYMPHOCYTES # BLD AUTO: 2.3 K/UL (ref 1–4.8)
LYMPHOCYTES NFR BLD: 23.7 % (ref 18–48)
MCH RBC QN AUTO: 27.7 PG (ref 27–31)
MCHC RBC AUTO-ENTMCNC: 33.1 G/DL (ref 32–36)
MCV RBC AUTO: 84 FL (ref 82–98)
METHADONE UR QL SCN>300 NG/ML: NEGATIVE
MICROSCOPIC COMMENT: NORMAL
MONOCYTES # BLD AUTO: 0.5 K/UL (ref 0.3–1)
MONOCYTES NFR BLD: 4.8 % (ref 4–15)
NEUTROPHILS # BLD AUTO: 6.4 K/UL (ref 1.8–7.7)
NEUTROPHILS NFR BLD: 66.5 % (ref 38–73)
NITRITE UR QL STRIP: NEGATIVE
NRBC BLD-RTO: 0 /100 WBC
OPIATES UR QL SCN: NEGATIVE
PCP UR QL SCN>25 NG/ML: NEGATIVE
PH UR STRIP: 6 [PH] (ref 5–8)
PLATELET # BLD AUTO: 356 K/UL (ref 150–450)
PMV BLD AUTO: 9.1 FL (ref 9.2–12.9)
POTASSIUM SERPL-SCNC: 3.9 MMOL/L (ref 3.5–5.1)
PROT SERPL-MCNC: 7.4 G/DL (ref 6–8.4)
PROT UR QL STRIP: ABNORMAL
RBC # BLD AUTO: 4.44 M/UL (ref 4.6–6.2)
RBC #/AREA URNS HPF: 2 /HPF (ref 0–4)
SARS-COV-2 RDRP RESP QL NAA+PROBE: NEGATIVE
SODIUM SERPL-SCNC: 134 MMOL/L (ref 136–145)
SP GR UR STRIP: 1.02 (ref 1–1.03)
TOXICOLOGY INFORMATION: ABNORMAL
TSH SERPL DL<=0.005 MIU/L-ACNC: 0.46 UIU/ML (ref 0.4–4)
URN SPEC COLLECT METH UR: ABNORMAL
UROBILINOGEN UR STRIP-ACNC: NEGATIVE EU/DL
WBC # BLD AUTO: 9.54 K/UL (ref 3.9–12.7)
WBC #/AREA URNS HPF: 0 /HPF (ref 0–5)

## 2021-11-04 PROCEDURE — U0002 COVID-19 LAB TEST NON-CDC: HCPCS | Performed by: EMERGENCY MEDICINE

## 2021-11-04 PROCEDURE — 12011 RPR F/E/E/N/L/M 2.5 CM/<: CPT

## 2021-11-04 PROCEDURE — 81000 URINALYSIS NONAUTO W/SCOPE: CPT | Mod: 59 | Performed by: EMERGENCY MEDICINE

## 2021-11-04 PROCEDURE — 80307 DRUG TEST PRSMV CHEM ANLYZR: CPT | Performed by: EMERGENCY MEDICINE

## 2021-11-04 PROCEDURE — 85025 COMPLETE CBC W/AUTO DIFF WBC: CPT | Performed by: EMERGENCY MEDICINE

## 2021-11-04 PROCEDURE — 36415 COLL VENOUS BLD VENIPUNCTURE: CPT | Performed by: EMERGENCY MEDICINE

## 2021-11-04 PROCEDURE — 99284 EMERGENCY DEPT VISIT MOD MDM: CPT | Mod: 25

## 2021-11-04 PROCEDURE — 80143 DRUG ASSAY ACETAMINOPHEN: CPT | Performed by: EMERGENCY MEDICINE

## 2021-11-04 PROCEDURE — 82077 ASSAY SPEC XCP UR&BREATH IA: CPT | Performed by: EMERGENCY MEDICINE

## 2021-11-04 PROCEDURE — 84443 ASSAY THYROID STIM HORMONE: CPT | Performed by: EMERGENCY MEDICINE

## 2021-11-04 PROCEDURE — 12001 RPR S/N/AX/GEN/TRNK 2.5CM/<: CPT

## 2021-11-04 PROCEDURE — 25000003 PHARM REV CODE 250: Performed by: EMERGENCY MEDICINE

## 2021-11-04 PROCEDURE — S4991 NICOTINE PATCH NONLEGEND: HCPCS | Performed by: EMERGENCY MEDICINE

## 2021-11-04 PROCEDURE — 80053 COMPREHEN METABOLIC PANEL: CPT | Performed by: EMERGENCY MEDICINE

## 2021-11-04 RX ORDER — IBUPROFEN 200 MG
1 TABLET ORAL DAILY
Status: DISCONTINUED | OUTPATIENT
Start: 2021-11-04 | End: 2021-11-04 | Stop reason: HOSPADM

## 2021-11-04 RX ORDER — LIDOCAINE HYDROCHLORIDE 10 MG/ML
10 INJECTION INFILTRATION; PERINEURAL
Status: COMPLETED | OUTPATIENT
Start: 2021-11-04 | End: 2021-11-04

## 2021-11-04 RX ADMIN — LIDOCAINE HYDROCHLORIDE 10 ML: 10 INJECTION, SOLUTION INFILTRATION; PERINEURAL at 07:11

## 2021-11-04 RX ADMIN — Medication 1 PATCH: at 06:11

## 2021-11-13 ENCOUNTER — HOSPITAL ENCOUNTER (EMERGENCY)
Facility: HOSPITAL | Age: 51
Discharge: HOME | End: 2021-11-13
Attending: EMERGENCY MEDICINE
Payer: MEDICAID

## 2021-11-13 VITALS
HEART RATE: 83 BPM | WEIGHT: 180 LBS | OXYGEN SATURATION: 99 % | TEMPERATURE: 98 F | BODY MASS INDEX: 25.2 KG/M2 | RESPIRATION RATE: 18 BRPM | HEIGHT: 71 IN | DIASTOLIC BLOOD PRESSURE: 83 MMHG | SYSTOLIC BLOOD PRESSURE: 149 MMHG

## 2021-11-13 DIAGNOSIS — R06.02 SOB (SHORTNESS OF BREATH): ICD-10-CM

## 2021-11-13 LAB
ALBUMIN SERPL BCP-MCNC: 4 G/DL (ref 3.5–5.2)
ALP SERPL-CCNC: 88 U/L (ref 55–135)
ALT SERPL W/O P-5'-P-CCNC: 11 U/L (ref 10–44)
ANION GAP SERPL CALC-SCNC: 19 MMOL/L (ref 8–16)
AST SERPL-CCNC: 21 U/L (ref 10–40)
BASOPHILS # BLD AUTO: 0.06 K/UL (ref 0–0.2)
BASOPHILS NFR BLD: 0.6 % (ref 0–1.9)
BILIRUB SERPL-MCNC: 0.4 MG/DL (ref 0.1–1)
BUN SERPL-MCNC: 4 MG/DL (ref 6–20)
CALCIUM SERPL-MCNC: 9.2 MG/DL (ref 8.7–10.5)
CHLORIDE SERPL-SCNC: 90 MMOL/L (ref 95–110)
CO2 SERPL-SCNC: 21 MMOL/L (ref 23–29)
CREAT SERPL-MCNC: 0.8 MG/DL (ref 0.5–1.4)
DIFFERENTIAL METHOD: ABNORMAL
EOSINOPHIL # BLD AUTO: 0.1 K/UL (ref 0–0.5)
EOSINOPHIL NFR BLD: 0.7 % (ref 0–8)
ERYTHROCYTE [DISTWIDTH] IN BLOOD BY AUTOMATED COUNT: 14.6 % (ref 11.5–14.5)
EST. GFR  (AFRICAN AMERICAN): >60 ML/MIN/1.73 M^2
EST. GFR  (NON AFRICAN AMERICAN): >60 ML/MIN/1.73 M^2
GLUCOSE SERPL-MCNC: 76 MG/DL (ref 70–110)
HCT VFR BLD AUTO: 34 % (ref 40–54)
HGB BLD-MCNC: 12 G/DL (ref 14–18)
IMM GRANULOCYTES # BLD AUTO: 0.07 K/UL (ref 0–0.04)
IMM GRANULOCYTES NFR BLD AUTO: 0.7 % (ref 0–0.5)
LIPASE SERPL-CCNC: 69 U/L (ref 4–60)
LYMPHOCYTES # BLD AUTO: 2.1 K/UL (ref 1–4.8)
LYMPHOCYTES NFR BLD: 21 % (ref 18–48)
MCH RBC QN AUTO: 28.5 PG (ref 27–31)
MCHC RBC AUTO-ENTMCNC: 35.3 G/DL (ref 32–36)
MCV RBC AUTO: 81 FL (ref 82–98)
MONOCYTES # BLD AUTO: 0.7 K/UL (ref 0.3–1)
MONOCYTES NFR BLD: 7.5 % (ref 4–15)
NEUTROPHILS # BLD AUTO: 6.8 K/UL (ref 1.8–7.7)
NEUTROPHILS NFR BLD: 69.5 % (ref 38–73)
NRBC BLD-RTO: 0 /100 WBC
PLATELET # BLD AUTO: 441 K/UL (ref 150–450)
PMV BLD AUTO: 8.6 FL (ref 9.2–12.9)
POTASSIUM SERPL-SCNC: 3.6 MMOL/L (ref 3.5–5.1)
PROT SERPL-MCNC: 7.2 G/DL (ref 6–8.4)
RBC # BLD AUTO: 4.21 M/UL (ref 4.6–6.2)
SODIUM SERPL-SCNC: 130 MMOL/L (ref 136–145)
WBC # BLD AUTO: 9.83 K/UL (ref 3.9–12.7)

## 2021-11-13 PROCEDURE — 93005 ELECTROCARDIOGRAM TRACING: CPT

## 2021-11-13 PROCEDURE — 99285 EMERGENCY DEPT VISIT HI MDM: CPT | Mod: 25

## 2021-11-13 PROCEDURE — 93010 EKG 12-LEAD: ICD-10-PCS | Mod: ,,, | Performed by: INTERNAL MEDICINE

## 2021-11-13 PROCEDURE — 93010 ELECTROCARDIOGRAM REPORT: CPT | Mod: ,,, | Performed by: INTERNAL MEDICINE

## 2021-11-13 PROCEDURE — 36415 COLL VENOUS BLD VENIPUNCTURE: CPT | Performed by: EMERGENCY MEDICINE

## 2021-11-13 PROCEDURE — 85025 COMPLETE CBC W/AUTO DIFF WBC: CPT | Performed by: EMERGENCY MEDICINE

## 2021-11-13 PROCEDURE — 83690 ASSAY OF LIPASE: CPT | Performed by: EMERGENCY MEDICINE

## 2021-11-13 PROCEDURE — 80053 COMPREHEN METABOLIC PANEL: CPT | Performed by: EMERGENCY MEDICINE

## 2022-04-11 DIAGNOSIS — J42 UNSPECIFIED CHRONIC BRONCHITIS: Primary | ICD-10-CM

## 2022-04-22 ENCOUNTER — HOSPITAL ENCOUNTER (OUTPATIENT)
Dept: PULMONOLOGY | Facility: HOSPITAL | Age: 52
Discharge: HOME | End: 2022-04-22
Attending: INTERNAL MEDICINE
Payer: MEDICAID

## 2022-04-22 DIAGNOSIS — J42 UNSPECIFIED CHRONIC BRONCHITIS: ICD-10-CM

## 2022-04-22 PROCEDURE — 94727 GAS DIL/WSHOT DETER LNG VOL: CPT

## 2022-04-22 PROCEDURE — 94010 BREATHING CAPACITY TEST: CPT | Mod: XB

## 2022-04-22 PROCEDURE — 94060 EVALUATION OF WHEEZING: CPT

## 2022-04-22 PROCEDURE — 94729 DIFFUSING CAPACITY: CPT

## 2022-11-15 DIAGNOSIS — J42 UNSPECIFIED CHRONIC BRONCHITIS: Primary | ICD-10-CM

## 2022-11-29 ENCOUNTER — HOSPITAL ENCOUNTER (OUTPATIENT)
Dept: PULMONOLOGY | Facility: HOSPITAL | Age: 52
Discharge: HOME | End: 2022-11-29
Attending: INTERNAL MEDICINE
Payer: MEDICAID

## 2022-11-29 DIAGNOSIS — J42 UNSPECIFIED CHRONIC BRONCHITIS: ICD-10-CM

## 2022-11-29 PROCEDURE — 94729 DIFFUSING CAPACITY: CPT

## 2022-11-29 PROCEDURE — 94727 GAS DIL/WSHOT DETER LNG VOL: CPT

## 2022-11-29 PROCEDURE — 94010 BREATHING CAPACITY TEST: CPT

## 2022-12-02 ENCOUNTER — TELEPHONE (OUTPATIENT)
Dept: GASTROENTEROLOGY | Facility: CLINIC | Age: 52
End: 2022-12-02
Payer: MEDICAID

## 2022-12-02 DIAGNOSIS — Z12.11 SCREENING FOR COLON CANCER: Primary | ICD-10-CM

## 2022-12-02 NOTE — TELEPHONE ENCOUNTER
Colonoscopy 1/11 at 12pm arrive for 11am instructions reviewed and patient states understanding. Copy to sister Suze in ENDO .  Plavix clearance sent to Dr. Garcia

## 2023-01-11 ENCOUNTER — HOSPITAL ENCOUNTER (OUTPATIENT)
Facility: HOSPITAL | Age: 53
Discharge: HOME | End: 2023-01-11
Attending: INTERNAL MEDICINE | Admitting: INTERNAL MEDICINE
Payer: COMMERCIAL

## 2023-01-11 ENCOUNTER — ANESTHESIA (OUTPATIENT)
Dept: ENDOSCOPY | Facility: HOSPITAL | Age: 53
End: 2023-01-11
Payer: MEDICAID

## 2023-01-11 ENCOUNTER — ANESTHESIA EVENT (OUTPATIENT)
Dept: ENDOSCOPY | Facility: HOSPITAL | Age: 53
End: 2023-01-11
Payer: MEDICAID

## 2023-01-11 VITALS
TEMPERATURE: 98 F | BODY MASS INDEX: 23.8 KG/M2 | HEIGHT: 71 IN | RESPIRATION RATE: 16 BRPM | HEART RATE: 72 BPM | WEIGHT: 170 LBS | DIASTOLIC BLOOD PRESSURE: 71 MMHG | OXYGEN SATURATION: 98 % | SYSTOLIC BLOOD PRESSURE: 123 MMHG

## 2023-01-11 DIAGNOSIS — Z12.11 SCREEN FOR COLON CANCER: ICD-10-CM

## 2023-01-11 DIAGNOSIS — K64.8 INTERNAL HEMORRHOIDS: ICD-10-CM

## 2023-01-11 DIAGNOSIS — K63.5 POLYP OF COLON, UNSPECIFIED PART OF COLON, UNSPECIFIED TYPE: Primary | ICD-10-CM

## 2023-01-11 PROCEDURE — 88305 TISSUE EXAM BY PATHOLOGIST: CPT | Mod: 26,,, | Performed by: PATHOLOGY

## 2023-01-11 PROCEDURE — D9220A PRA ANESTHESIA: ICD-10-PCS | Mod: ANES,,, | Performed by: ANESTHESIOLOGY

## 2023-01-11 PROCEDURE — 00811 ANES LWR INTST NDSC NOS: CPT | Performed by: INTERNAL MEDICINE

## 2023-01-11 PROCEDURE — 45385 COLONOSCOPY W/LESION REMOVAL: CPT | Mod: ,,, | Performed by: INTERNAL MEDICINE

## 2023-01-11 PROCEDURE — D9220A PRA ANESTHESIA: Mod: ANES,,, | Performed by: ANESTHESIOLOGY

## 2023-01-11 PROCEDURE — 45385 PR COLONOSCOPY,REMV LESN,SNARE: ICD-10-PCS | Mod: ,,, | Performed by: INTERNAL MEDICINE

## 2023-01-11 PROCEDURE — 37000008 HC ANESTHESIA 1ST 15 MINUTES: Performed by: INTERNAL MEDICINE

## 2023-01-11 PROCEDURE — 45385 COLONOSCOPY W/LESION REMOVAL: CPT | Performed by: INTERNAL MEDICINE

## 2023-01-11 PROCEDURE — 63600175 PHARM REV CODE 636 W HCPCS: Performed by: STUDENT IN AN ORGANIZED HEALTH CARE EDUCATION/TRAINING PROGRAM

## 2023-01-11 PROCEDURE — 25000003 PHARM REV CODE 250: Performed by: STUDENT IN AN ORGANIZED HEALTH CARE EDUCATION/TRAINING PROGRAM

## 2023-01-11 PROCEDURE — 27201012 HC FORCEPS, HOT/COLD, DISP: Performed by: INTERNAL MEDICINE

## 2023-01-11 PROCEDURE — D9220A PRA ANESTHESIA: ICD-10-PCS | Mod: CRNA,,, | Performed by: STUDENT IN AN ORGANIZED HEALTH CARE EDUCATION/TRAINING PROGRAM

## 2023-01-11 PROCEDURE — 45380 PR COLONOSCOPY,BIOPSY: ICD-10-PCS | Mod: 59,,, | Performed by: INTERNAL MEDICINE

## 2023-01-11 PROCEDURE — 37000009 HC ANESTHESIA EA ADD 15 MINS: Performed by: INTERNAL MEDICINE

## 2023-01-11 PROCEDURE — 88305 TISSUE EXAM BY PATHOLOGIST: CPT | Performed by: PATHOLOGY

## 2023-01-11 PROCEDURE — 45380 COLONOSCOPY AND BIOPSY: CPT | Mod: 59 | Performed by: INTERNAL MEDICINE

## 2023-01-11 PROCEDURE — 25000003 PHARM REV CODE 250: Performed by: INTERNAL MEDICINE

## 2023-01-11 PROCEDURE — 27201089 HC SNARE, DISP (ANY): Performed by: INTERNAL MEDICINE

## 2023-01-11 PROCEDURE — D9220A PRA ANESTHESIA: Mod: CRNA,,, | Performed by: STUDENT IN AN ORGANIZED HEALTH CARE EDUCATION/TRAINING PROGRAM

## 2023-01-11 PROCEDURE — 88305 TISSUE EXAM BY PATHOLOGIST: ICD-10-PCS | Mod: 26,,, | Performed by: PATHOLOGY

## 2023-01-11 PROCEDURE — 45380 COLONOSCOPY AND BIOPSY: CPT | Mod: 59,,, | Performed by: INTERNAL MEDICINE

## 2023-01-11 RX ORDER — SODIUM CHLORIDE 9 MG/ML
INJECTION, SOLUTION INTRAVENOUS CONTINUOUS
Status: DISCONTINUED | OUTPATIENT
Start: 2023-01-11 | End: 2023-01-11 | Stop reason: HOSPADM

## 2023-01-11 RX ORDER — PROPOFOL 10 MG/ML
VIAL (ML) INTRAVENOUS
Status: DISCONTINUED | OUTPATIENT
Start: 2023-01-11 | End: 2023-01-11

## 2023-01-11 RX ORDER — LIDOCAINE HYDROCHLORIDE 20 MG/ML
INJECTION INTRAVENOUS
Status: DISCONTINUED | OUTPATIENT
Start: 2023-01-11 | End: 2023-01-11

## 2023-01-11 RX ADMIN — PROPOFOL 20 MG: 10 INJECTION, EMULSION INTRAVENOUS at 10:01

## 2023-01-11 RX ADMIN — LIDOCAINE HYDROCHLORIDE 100 MG: 20 INJECTION, SOLUTION INTRAVENOUS at 10:01

## 2023-01-11 RX ADMIN — SODIUM CHLORIDE: 0.9 INJECTION, SOLUTION INTRAVENOUS at 10:01

## 2023-01-11 RX ADMIN — PROPOFOL 50 MG: 10 INJECTION, EMULSION INTRAVENOUS at 10:01

## 2023-01-11 RX ADMIN — PROPOFOL 100 MG: 10 INJECTION, EMULSION INTRAVENOUS at 10:01

## 2023-01-11 RX ADMIN — PROPOFOL 40 MG: 10 INJECTION, EMULSION INTRAVENOUS at 10:01

## 2023-01-11 RX ADMIN — PROPOFOL 30 MG: 10 INJECTION, EMULSION INTRAVENOUS at 10:01

## 2023-01-11 NOTE — PROVATION PATIENT INSTRUCTIONS
Discharge Summary/Instructions after an Endoscopic Procedure  Patient Name: Brannon Severino  Patient MRN: [id number]  Patient YOB: 1970 Wednesday, January 11, 2023  Kermit Parham MD  Dear patient,  As a result of recent federal legislation (The Federal Cures Act), you may   receive lab or pathology results from your procedure in your [facility]   account before your physician is able to contact you. Your physician or   their representative will relay the results to you with their   recommendations at their soonest availability.  Thank you,  RESTRICTIONS:  During your procedure today, you received medications for sedation.  These   medications may affect your judgment, balance and coordination.  Therefore,   for 24 hours, you have the following restrictions:   - DO NOT drive a car, operate machinery, make legal/financial decisions,   sign important papers or drink alcohol.    ACTIVITY:  Today: no heavy lifting, straining or running due to procedural   sedation/anesthesia.  The following day: return to full activity including work.  DIET:  Eat and drink normally unless instructed otherwise.     TREATMENT FOR COMMON SIDE EFFECTS:  - Mild abdominal pain, nausea, belching, bloating or excessive gas:  rest,   eat lightly and use a heating pad.  - Sore Throat: treat with throat lozenges and/or gargle with warm salt   water.  - Because air was used during the procedure, expelling large amounts of air   from your rectum or belching is normal.  - If a bowel prep was taken, you may not have a bowel movement for 1-3 days.    This is normal.  SYMPTOMS TO WATCH FOR AND REPORT TO YOUR PHYSICIAN:  1. Abdominal pain or bloating, other than gas cramps.  2. Chest pain.  3. Back pain.  4. Signs of infection such as: chills or fever occurring within 24 hours   after the procedure.  5. Rectal bleeding, which would show as bright red, maroon, or black stools.   (A tablespoon of blood from the rectum is not serious,  especially if   hemorrhoids are present.)  6. Vomiting.  7. Weakness or dizziness.  GO DIRECTLY TO THE NEAREST EMERGENCY ROOM IF YOU HAVE ANY OF THE FOLLOWING:      Difficulty breathing              Chills and/or fever over 101 F   Persistent vomiting and/or vomiting blood   Severe abdominal pain   Severe chest pain   Black, tarry stools   Bleeding- more than one tablespoon   Any other symptom or condition that you feel may need urgent attention  Your doctor recommends these additional instructions:  If any biopsies were taken, your doctors clinic will contact you in 1 to 2   weeks with any results.  - Patient has a contact number available for emergencies.  The signs and   symptoms of potential delayed complications were discussed with the   patient.  Return to normal activities tomorrow.  Written discharge   instructions were provided to the patient.   - High fiber diet.   - Continue present medications.   - Resume Plavix (clopidogrel) at prior dose today.   - Await pathology results.   - Repeat colonoscopy in 5 years for surveillance.   - Discharge patient to home (ambulatory).   - Return to GI office PRN.  For questions, problems or results please call your physician - Kermit Parham MD at Work:  [phone].  [facility], EMERGENCY ROOM PHONE NUMBER: [phone]  IF A COMPLICATION OR EMERGENCY SITUATION ARISES AND YOU ARE UNABLE TO REACH   YOUR PHYSICIAN - GO DIRECTLY TO THE EMERGENCY ROOM.  Kermit Parham MD  1/11/2023 10:51:28 AM  This report has been verified and signed electronically.  Dear patient,  As a result of recent federal legislation (The Federal Cures Act), you may   receive lab or pathology results from your procedure in your [facility]   account before your physician is able to contact you. Your physician or   their representative will relay the results to you with their   recommendations at their soonest availability.  Thank you,  PROVATION

## 2023-01-11 NOTE — PLAN OF CARE
Vss, norberto po fluids, denies pain, ambulates easily. IV removed, catheter intact. Discharge instructions provided and states understanding. States ready to go home.  Discharged from facility with family per wheelchair.

## 2023-01-11 NOTE — TRANSFER OF CARE
"Anesthesia Transfer of Care Note    Patient: Brannon Severino    Procedure(s) Performed: Procedure(s) (LRB):  COLONOSCOPY (N/A)    Patient location: GI    Anesthesia Type: general    Transport from OR: Transported from OR on room air with adequate spontaneous ventilation    Post pain: adequate analgesia    Post assessment: no apparent anesthetic complications and tolerated procedure well    Post vital signs: stable    Level of consciousness: awake    Nausea/Vomiting: no nausea/vomiting    Complications: none    Transfer of care protocol was followed      Last vitals:   Visit Vitals  /81 (BP Location: Left arm, Patient Position: Lying)   Pulse 73   Temp 36.6 °C (97.9 °F) (Skin)   Resp 15   Ht 5' 11" (1.803 m)   Wt 77.1 kg (170 lb)   SpO2 99%   BMI 23.71 kg/m²     "

## 2023-01-11 NOTE — ANESTHESIA POSTPROCEDURE EVALUATION
Anesthesia Post Evaluation    Patient: Brannon Severino    Procedure(s) Performed: Procedure(s) (LRB):  COLONOSCOPY (N/A)    Final Anesthesia Type: general      Patient location during evaluation: PACU  Patient participation: Yes- Able to Participate  Level of consciousness: awake and alert  Post-procedure vital signs: reviewed and stable  Pain management: adequate  Airway patency: patent    PONV status at discharge: No PONV  Anesthetic complications: no      Cardiovascular status: hemodynamically stable  Respiratory status: unassisted and room air  Hydration status: euvolemic  Follow-up not needed.          Vitals Value Taken Time   /71 01/11/23 1105   Temp 36.4 °C (97.5 °F) 01/11/23 1055   Pulse 72 01/11/23 1105   Resp 16 01/11/23 1105   SpO2 98 % 01/11/23 1105         Event Time   Out of Recovery 11:31:20         Pain/Khadar Score: Khadar Score: 10 (1/11/2023 11:05 AM)

## 2023-01-11 NOTE — H&P
CC: Screening for colorectal cancer - first occurrence    52 year old male with above. States that symptoms are absent, no alleviating/exacerbating factors. No family history of colorectal CA. No personal history of polyps. No bleeding or weight loss.     ROS:  No headache, no fever/chills, no chest pain/SOB, no nausea/vomiting/diarrhea/constipation/GI bleeding/abdominal pain, no dysuria/hematuria.    VSSAF   Exam:   Alert and oriented x 3; no apparent distress   PERRLA, sclera anicteric  CV: Regular rate/rhythm, normal PMI   Lungs: Clear bilaterally with no wheeze/rales   Abdomen: Soft, NT/ND, normal bowel sounds   Ext: No cyanosis, clubbing     Impression:   As above    Plan:   Proceed with endoscopy. Further recs to follow.

## 2023-01-11 NOTE — ANESTHESIA PREPROCEDURE EVALUATION
01/11/2023  Brannon Severino is a 52 y.o., male.      Pre-op Assessment    I have reviewed the Patient Summary Reports.     I have reviewed the Nursing Notes. I have reviewed the NPO Status.   I have reviewed the Medications.     Review of Systems  Anesthesia Hx:  No problems with previous Anesthesia    Social:  Smoker H/o alcohol abuse    Cardiovascular:   Hypertension PVD    Pulmonary:   COPD    Neurological:   CVA    Endocrine:   Diabetes        Physical Exam  General: Well nourished, Cooperative, Alert and Oriented    Airway:  Mallampati: II   Mouth Opening: Normal  TM Distance: Normal  Neck ROM: Normal ROM    Dental:  Intact        Anesthesia Plan  Type of Anesthesia, risks & benefits discussed:    Anesthesia Type: Gen Natural Airway  Intra-op Monitoring Plan: Standard ASA Monitors  Induction:  IV  Informed Consent: Informed consent signed with the Patient and all parties understand the risks and agree with anesthesia plan.  All questions answered.   ASA Score: 3  Day of Surgery Review of History & Physical: H&P Update referred to the surgeon/provider.    Ready For Surgery From Anesthesia Perspective.     .

## 2023-01-17 DIAGNOSIS — M79.601 PAIN IN RIGHT ARM: Primary | ICD-10-CM

## 2023-01-17 LAB
FINAL PATHOLOGIC DIAGNOSIS: NORMAL
GROSS: NORMAL
Lab: NORMAL

## 2023-01-24 ENCOUNTER — TELEPHONE (OUTPATIENT)
Dept: REHABILITATION | Facility: HOSPITAL | Age: 53
End: 2023-01-24
Payer: COMMERCIAL

## 2023-01-26 ENCOUNTER — TELEPHONE (OUTPATIENT)
Dept: REHABILITATION | Facility: HOSPITAL | Age: 53
End: 2023-01-26
Payer: COMMERCIAL

## 2023-01-27 ENCOUNTER — TELEPHONE (OUTPATIENT)
Dept: REHABILITATION | Facility: HOSPITAL | Age: 53
End: 2023-01-27

## 2023-02-03 ENCOUNTER — DOCUMENTATION ONLY (OUTPATIENT)
Dept: REHABILITATION | Facility: HOSPITAL | Age: 53
End: 2023-02-03
Payer: COMMERCIAL

## 2023-02-03 NOTE — PROGRESS NOTES
Faxed request for proper info to be put on order (order says right arm pain but clinic note says left is the affected side-see media)    Faxed request to [phone]/referring provider's office

## 2023-02-03 NOTE — PROGRESS NOTES
Faxed identical request like before but to [phone]/referring provider since previous number incorrect

## 2023-02-06 ENCOUNTER — DOCUMENTATION ONLY (OUTPATIENT)
Dept: REHABILITATION | Facility: HOSPITAL | Age: 53
End: 2023-02-06
Payer: COMMERCIAL

## 2023-02-06 NOTE — PROGRESS NOTES
Faxed request for proper information on therapy order (left arm pain, occupational therapy) since last order received had proper diagnosis but was for PT; faxed request to [phone]

## 2023-02-07 ENCOUNTER — TELEPHONE (OUTPATIENT)
Dept: REHABILITATION | Facility: HOSPITAL | Age: 53
End: 2023-02-07

## 2023-02-09 DIAGNOSIS — M79.602 PAIN IN LEFT ARM: Primary | ICD-10-CM

## 2023-02-24 ENCOUNTER — CLINICAL SUPPORT (OUTPATIENT)
Dept: REHABILITATION | Facility: HOSPITAL | Age: 53
End: 2023-02-24
Payer: MEDICAID

## 2023-02-24 DIAGNOSIS — M25.512 LEFT SHOULDER PAIN, UNSPECIFIED CHRONICITY: Primary | ICD-10-CM

## 2023-02-24 DIAGNOSIS — M25.612 SHOULDER STIFFNESS, LEFT: ICD-10-CM

## 2023-02-24 DIAGNOSIS — M79.602 PAIN IN LEFT ARM: ICD-10-CM

## 2023-02-24 PROCEDURE — 97165 OT EVAL LOW COMPLEX 30 MIN: CPT | Mod: PN

## 2023-02-24 PROCEDURE — 97110 THERAPEUTIC EXERCISES: CPT | Mod: PN

## 2023-02-24 NOTE — PLAN OF CARE
[facility] Occupational Therapy  Initial Evaluation     Date: 2/24/2023  Name: Brannon Severino  Clinic Number: [id number]    Therapy Diagnosis:   Encounter Diagnoses   Name Primary?    Pain in left arm     Left shoulder pain, unspecified chronicity Yes    Shoulder stiffness, left      Physician: Dorinda Mckenzie MD    Physician Orders: evaluate and tx, see epic  Medical Diagnosis: left arm pain  Surgical Procedure and Date: n/a, n/a  Evaluation Date: 2/24/2023  Insurance Authorization Period Expiration: referral [id number] 2-24-23 thru 3-24-23              Plan of Care Certification Period: 2-24-23 thru 4-21-23  Date of Return to referral source's office: will get from patient    Visit # / Visits authorized: 1 / 1 referral [id number] 2-24-23 thru 3-24-23  Time In:8  Time Out: 830  Total Billable Time: 15 minutes    Precautions:  universal, see epic, protocol if applicable  Subjective     Involved Side: left  Dominant Side: right  Date of Onset: about 1 month pre 1-25-23  History of Current Condition/Mechanism of Injury: insidious onset, saw referral source, sent to therapy  Imaging: see epic  Previous Therapy: none recently, said had some on left arm for stroke that affected left arm which was about 10 years ago    Past Medical History/Physical Systems Review:   Brannon Severino  has a past medical history of COPD (chronic obstructive pulmonary disease), Diabetes mellitus, Hypertension, Pancreatitis, Psoriasis, and Stroke.    Brannon Severino  has a past surgical history that includes Tendon release (Right) and Colonoscopy (N/A, 1/11/2023).    Brannon has a current medication list which includes the following prescription(s): albuterol, amlodipine, aspirin, atorvastatin, calcipotriene, clobetasol 0.05%, clopidogrel, diphenhydramine, famotidine, fluoxetine, gabapentin, hydrochlorothiazide, levetiracetam, lisinopril, loratadine, metformin, metoprolol succinate, and mirtazapine.    Review  of patient's allergies indicates:  No Known Allergies     Patient's Goals for Therapy: full painless use    Pain:  Functional Pain Scale Rating 0-10:   4/10 on average  3/10 at best  9/10 at worst  Side:left  Location: diffuse shoulder  Description: ache  Aggravating Factors: some use  Easing Factors: rest  Occupation:  not working  Working presently: no  Duties: per job if working; typical self and home care    Functional Limitations/Social History:    Previous functional status includes: Independent with all ADLs.     Current Functional Status   Home/Living environment : lives with father    Limitation of Functional Status as follows: decreased motion, use, and strength with ADL   ADLs/IADLs:               See above   Leisure: not tested  pain meds: denies  nicotine: 1 pack cigarettes daily  caffeine: up to 1 pot of regular coffee daily    Objective   Posture: mild flexor synergy  Palpation:not tested  Sensation:denies burning, numbness, tingling  ROM:    Prom er 0 abd right 80 left 20   seems to have increased tone on left especially in shoulder internal rotators and adductors          CMS Impairment/Limitation/Restriction for FOTO Survey    Therapist reviewed FOTO scores for Brannon Severino on 2/24/2023.   FOTO documents entered into EPIC - see Media section.    Limitation Score: 45%  Category: Carrying    Current : CK = at least 40% but < 60% impaired, limited or restricted  Goal: CJ = at least 20% but < 40% impaired, limited or restricted               Treatment     Treatment Time In: 815  Treatment Time Out: 830  Total Treatment time separate from Evaluation time:15      Brannon received therapeutic exercises for 15 minutes including:  -see above and/or media section                    Home Exercise Program/Education:  Issued HEP (see patient instructions in EMR in media or note) . Exercises were reviewed and Brannon was able to demonstrate them prior to the end of the session.   Pt received a  written copy of exercises to perform at home. Brannon demonstrated good understanding of the education provided.  Pt was advised to perform these exercises free of pain, and to stop performing them if pain occurs.    Patient/Family Education: role of OT, goals for OT, scheduling/cancellations - pt verbalized understanding. Discussed insurance limitations with patient.    Additional Education provided: likely tx progression, expectations of rehab      May benefit from referring doctor considering botox or medical management of increased tone-see above    Assessment     Brannon Severino is a 52 y.o. male referred to outpatient occupational therapy and presents with a medical diagnosis of see above resulting in Decreased ROM, Increased pain, and Joint Stiffness and demonstrates limitations as described in the chart below. Following medical record review it is determined that pt will benefit from occupational therapy services in order to maximize pain free and/or functional use of left arm. The following goals were discussed with the patient and patient is in agreement with them as to be addressed in the treatment plan. The patient's rehab potential is fair.     Anticipated barriers to occupational therapy: pain, stiffness  Pt has no cultural, educational or language barriers to learning provided.    Profile and History Assessment of Occupational Performance Level of Clinical Decision Making Complexity Score   Occupational Profile:   Brannon Severino is a 52 y.o. male who lives with their family and is  does not work  Brannon Severino has difficulty with  ADLs and IADLs as listed previously, which  affecting his/her daily functional abilities.      Comorbidities:    has a past medical history of COPD (chronic obstructive pulmonary disease), Diabetes mellitus, Hypertension, Pancreatitis, Psoriasis, and Stroke.    Medical and Therapy History Review:   Brief               Performance  Deficits    Physical:  Joint Mobility  Pain    Cognitive:  No Deficits    Psychosocial:    No Deficits     Clinical Decision Making:  low    Assessment Process:  Problem-Focused Assessments    Modification/Need for Assistance:  Not Necessary    Intervention Selection:  Limited Treatment Options       low  Based on PMHX, co morbidities , data from assessments and functional level of assistance required with task and clinical presentation directly impacting function.           The following goals were discussed with the patient and patient is in agreement with them as to be addressed in the treatment plan.     Goals:   Short term goals to be met in 4 weeks 1. Patient will be I with HEP 2. Patient will have 2/10 pain with light use     Long term goals to be met by d/c 1. Patient will be I with d/c HEP 2. Patient will have 1/10 pain with light use l functional use   3. Patient will be I with all light ADL using left arm    all goals ongoing unless noted above or met today or in past but not noted yet    Plan   Certification Period/Plan of care expiration: 2/24/2023 to            4-21-23    Outpatient Occupational Therapy  1 times weekly for 4 weeks to include the following interventions: eval and tx    Above frequency and duration in above dates may change based on patient progress and need for therapy    Dinesh KYLE CHT    I certify the need for the services furnished under this plan of care.    doctor's signature/referring provider's signature:______________________________________________________

## 2023-03-02 ENCOUNTER — CLINICAL SUPPORT (OUTPATIENT)
Dept: REHABILITATION | Facility: HOSPITAL | Age: 53
End: 2023-03-02
Payer: MEDICAID

## 2023-03-02 DIAGNOSIS — M25.512 LEFT SHOULDER PAIN, UNSPECIFIED CHRONICITY: Primary | ICD-10-CM

## 2023-03-02 DIAGNOSIS — M25.612 SHOULDER STIFFNESS, LEFT: ICD-10-CM

## 2023-03-02 PROCEDURE — 97530 THERAPEUTIC ACTIVITIES: CPT | Mod: PN

## 2023-03-02 NOTE — PROGRESS NOTES
"  Occupational Therapy Daily Treatment Note     Date: 3/2/2023  Name: Brannon Severino  [employer] Number: [id number]    Therapy Diagnosis:   Encounter Diagnoses   Name Primary?    Left shoulder pain, unspecified chronicity Yes    Shoulder stiffness, left      Physician: Dorinda Mckenzie MD    Physician Orders: evaluate and tx, see epic  Medical Diagnosis: left arm pain  Surgical Procedure and Date: n/a, n/a  Evaluation Date: 2/24/2023  Insurance Authorization Period Expiration: see epic             Plan of Care Certification Period: 2-24-23 thru 4-21-23  Date of Return to referral source's office: will get from patient     Visit # / Visits authorized: see epic    Time In:7  Time Out: 8  Total Billable Time: 60 minutes    Precautions:  universal, see epic, protocol if applicable    Subjective     Pt reports: an understanding he likely will need a consult from referring md on medical management of increased tone in left UE likely left over from CVA he had some time ago  he is compliant with home exercise program.  Response to previous treatment:good  Functional change: none stated    Pain: 0/10 rest; up to 9/10 light use  Side:left  Location: diffuse shoulder      ache  Objective       Timed units:  4 therapeutic exercise                            time:7-8        Measurements:       Er 45, 90 abd and sidelying IR done on elevated treatment/therapy mat      right passive range of motion external rotation at 0 abduction 80   at 45 abduction 90    at 90 abduction 90;       sidelying internal rotation  70        internal rotation behind back 14"    lift off  left passive range of motion external rotation at 0 abduction 30    at 45 abduction 50    at  90 abduction abd to 90 and can put arm on table with mild tension so er 90 abd not tested;      sidelying internal rotation 30         internal rotation behind back belt           Fluidotherapy: n/a    Ultrasound:n/a      Upper body ergometer: n/a    Scar massage: " n/a    Stretches as tolerated-pain free: er 0, 45 abd; IR behind back, sleeper      Manual: n/a    Range of motion: n/a    Progressive resistive exercise: n/a      Home exercise program: see above and/or below    Home Exercises and Education Provided     Education provided:     Explained medication and/or botox injections may help to decrease increased flexor and internal rotator tone in left UE           progress towards goals   likely treatment progression  rationale of rehab interventions    Written Home Exercises/Information Provided: yes.        previously issued exercises and/or other issued home therapy instructions were reviewed if still part of current treatment plan as well as any issued today and Brannon was able to demonstrate them prior to the end of the session.  Brannon demonstrated good understanding of the HEP provided.     See EMR under patient instructions and/or media for HEP issued today or in past    Assessment             Pt would continue to benefit from skilled occupational therapy in order to facilitate improved functional use and decreased tightness    Brannon is progressing well towards his goals and there are no updates to goals at this time unless goals noted below are different than goals on previous notes or evaluation. Pt prognosis is good  Pt will continue to benefit from skilled outpatient occupational therapy to address the deficits listed in the problem list on initial evaluation to provide pt/family education and to maximize pt's level of independence in the home and community environment.     Anticipated barriers to occupational therapy: chronicity of condition, tightness, pain    Pt's spiritual, cultural and educational needs considered and pt agreeable to plan of care and goals.    Goals:  Short term goals to be met in 4 weeks 1. Patient will be I with HEP 2. Patient will have 2/10 pain with light use      Long term goals to be met by d/c 1. Patient will be I with d/c  HEP 2. Patient will have 1/10 pain with light use l functional use   3. Patient will be I with all light ADL using left arm     all goals ongoing unless noted above or met today or in past but not noted yet            Any goals met today ?  (if any met see above)    Updates/Grading for next session: as needed    Plan: evaluate and treat    Dinesh KYLE CHT

## 2023-03-09 ENCOUNTER — CLINICAL SUPPORT (OUTPATIENT)
Dept: REHABILITATION | Facility: HOSPITAL | Age: 53
End: 2023-03-09
Payer: MEDICAID

## 2023-03-09 DIAGNOSIS — M79.602 PAIN IN LEFT ARM: ICD-10-CM

## 2023-03-09 DIAGNOSIS — M25.512 LEFT SHOULDER PAIN, UNSPECIFIED CHRONICITY: Primary | ICD-10-CM

## 2023-03-09 DIAGNOSIS — M79.602 LEFT ARM PAIN: ICD-10-CM

## 2023-03-09 DIAGNOSIS — M25.612 SHOULDER STIFFNESS, LEFT: ICD-10-CM

## 2023-03-09 PROCEDURE — 97530 THERAPEUTIC ACTIVITIES: CPT | Mod: PN

## 2023-03-09 NOTE — PROGRESS NOTES
Occupational Therapy Daily Treatment Note     Date: 3/9/2023  Name: Brannon Severino  Clinic Number: [id number]    Therapy Diagnosis:   Encounter Diagnoses   Name Primary?    Left shoulder pain, unspecified chronicity Yes    Shoulder stiffness, left     Pain in left arm     Left arm pain      Physician: Dorinda Mckenzie MD    Physician Orders: evaluate and tx, see epic  Medical Diagnosis: left arm pain  Surgical Procedure and Date: n/a, n/a  Evaluation Date: 2/24/2023  Insurance Authorization Period Expiration: referral [id number] 3-3-23 thru 4-28-23         Plan of Care Certification Period: 2-24-23 thru 4-21-23  Date of Return to referral source's office: will get from patient     Visit # / Visits authorized: 2 / 4 referral [id number] 3-3-23 thru 4-28-23    Time In:7  Time Out: 8  Total Billable Time: 60 minutes    Precautions:  universal, see epic, protocol if applicable    Subjective     Pt reports: doing HEP  he is compliant with home exercise program.  Response to previous treatment:good  Functional change: none stated    Pain: 0/10 rest; up to 9/10 light use  Side:left  Location: diffuse shoulder      ache  Objective       Timed units:  4 therapeutic exercise                            time:7-8        Measurements:       Prom                          right              left  Elbow ext/flex            0/150          40/150  Sup/pro                    90/80           90/80  Df/pf                         80/80           80/80  Rd/ud                       20/40          20/40  Digits                       full                 full        Fluidotherapy: n/a    Ultrasound:n/a      Upper body ergometer: n/a    Scar massage: n/a    Stretches as tolerated-pain free: er 45 abd; IR behind back, sleeper      Manual: n/a    Range of motion: n/a    Progressive resistive exercise: n/a      Home exercise program: see above and/or below    Home Exercises and Education Provided     Education provided:     Explained elbow ext  stretches likely will be added soon           progress towards goals   likely treatment progression  rationale of rehab interventions    Written Home Exercises/Information Provided: yes.        previously issued exercises and/or other issued home therapy instructions were reviewed if still part of current treatment plan as well as any issued today and Brannon was able to demonstrate them prior to the end of the session.  Brannon demonstrated good understanding of the HEP provided.     See EMR under patient instructions and/or media for HEP issued today or in past    Assessment       Increased internal rotator and flexor tone/classic flexor synergy pattern noted      Pt would continue to benefit from skilled occupational therapy in order to facilitate improved functional use and decreased tightness    Brannon is progressing well towards his goals and there are no updates to goals at this time unless goals noted below are different than goals on previous notes or evaluation. Pt prognosis is good  Pt will continue to benefit from skilled outpatient occupational therapy to address the deficits listed in the problem list on initial evaluation to provide pt/family education and to maximize pt's level of independence in the home and community environment.     Anticipated barriers to occupational therapy: chronicity of condition, tightness, pain    Pt's spiritual, cultural and educational needs considered and pt agreeable to plan of care and goals.    Goals:  Short term goals to be met in 4 weeks 1. Patient will be I with HEP 2. Patient will have 2/10 pain with light use      Long term goals to be met by d/c 1. Patient will be I with d/c HEP 2. Patient will have 1/10 pain with light use l functional use   3. Patient will be I with all light ADL using left arm     all goals ongoing unless noted above or met today or in past but not noted yet            Any goals met today ?  (if any met see above)    Updates/Grading  for next session: as needed    Plan: evaluate and treat    Dinesh KYLE CHT

## 2023-03-17 ENCOUNTER — DOCUMENTATION ONLY (OUTPATIENT)
Dept: REHABILITATION | Facility: HOSPITAL | Age: 53
End: 2023-03-17

## 2023-03-17 NOTE — PROGRESS NOTES
Told patient continued clinic OT will be put on hold since I think he needs some type of medical management of what appears to be hypertonicity in left UE consistent with mild flexor synergy/increased tone as described previously    He said he sees referring md on 4-22-23 and I said I will send progress note to referring md with my concerns

## 2023-04-28 ENCOUNTER — DOCUMENTATION ONLY (OUTPATIENT)
Dept: REHABILITATION | Facility: HOSPITAL | Age: 53
End: 2023-04-28
Payer: MEDICAID

## 2023-05-01 ENCOUNTER — DOCUMENTATION ONLY (OUTPATIENT)
Dept: REHABILITATION | Facility: HOSPITAL | Age: 53
End: 2023-05-01
Payer: MEDICAID

## 2023-05-01 NOTE — PROGRESS NOTES
Outpatient Therapy Discharge Summary     Date: 5-1-23      Name: Brannon Severino  [facility] Number: [id number]    Therapy Diagnosis: No diagnosis found.  Physician: No ref. provider found    Physician Orders: see evaluation  Medical Diagnosis: see evaluation  Evaluation Date: 2-24-23      Date of Last visit: 3-9-23  Total Visits Received: 3  Cancelled Visits: see epic  No Show Visits: see epic    Assessment    Goals: see last note    Discharge reason: Other:  no updated order received (patient was to see referring provider on 4-22-23)    Plan   This patient is discharged from Occupational Therapy

## 2023-05-05 ENCOUNTER — DOCUMENTATION ONLY (OUTPATIENT)
Dept: REHABILITATION | Facility: HOSPITAL | Age: 53
End: 2023-05-05
Payer: MEDICAID

## 2024-01-29 ENCOUNTER — TELEPHONE (OUTPATIENT)
Dept: OPHTHALMOLOGY | Facility: CLINIC | Age: 54
End: 2024-01-29
Payer: MEDICAID

## 2024-01-29 NOTE — TELEPHONE ENCOUNTER
Spoke to pt and scheduled DM exam. Made aware cannot check for RX for glasses       ----- Message from Tad Schwarz sent at 1/29/2024 12:15 PM CST -----  Type:  Appointment Request    Caller is requesting a sooner appointment.  Caller declined first available appointment listed below.  Caller will not accept being placed on the waitlist and is requesting a message be sent to doctor.  Name of Caller:pt   When is the first available appointment?books are closed   Symptoms:Annual eye/diabetic exam  Would the patient rather a call back or a response via [facility]? Pembe Panjur  Best Call Back Number:[phone]  Additional Information:   Pt requesting to be seen With Provider: Stefanie Mack OD ( do not have a inbasket pool)  Pt Preferred Date Range: Any date 5/13/2024 or later  Pt Preferred Times: Any Time

## 2024-05-31 ENCOUNTER — OFFICE VISIT (OUTPATIENT)
Dept: URGENT CARE | Facility: CLINIC | Age: 54
End: 2024-05-31
Payer: MEDICAID

## 2024-05-31 ENCOUNTER — HOSPITAL ENCOUNTER (EMERGENCY)
Facility: HOSPITAL | Age: 54
Discharge: HOME | End: 2024-05-31
Attending: EMERGENCY MEDICINE
Payer: MEDICAID

## 2024-05-31 VITALS
HEIGHT: 70 IN | SYSTOLIC BLOOD PRESSURE: 150 MMHG | DIASTOLIC BLOOD PRESSURE: 83 MMHG | WEIGHT: 170 LBS | RESPIRATION RATE: 20 BRPM | TEMPERATURE: 99 F | HEART RATE: 69 BPM | BODY MASS INDEX: 24.34 KG/M2 | OXYGEN SATURATION: 100 %

## 2024-05-31 VITALS
HEART RATE: 93 BPM | RESPIRATION RATE: 16 BRPM | TEMPERATURE: 99 F | SYSTOLIC BLOOD PRESSURE: 128 MMHG | BODY MASS INDEX: 23.71 KG/M2 | OXYGEN SATURATION: 97 % | WEIGHT: 170 LBS | DIASTOLIC BLOOD PRESSURE: 85 MMHG

## 2024-05-31 DIAGNOSIS — L55.1 SUNBURN OF SECOND DEGREE: Primary | ICD-10-CM

## 2024-05-31 DIAGNOSIS — L03.116 CELLULITIS OF LEFT FOOT: ICD-10-CM

## 2024-05-31 DIAGNOSIS — T14.8XXA INFECTED WOUND: Primary | ICD-10-CM

## 2024-05-31 DIAGNOSIS — L55.2: ICD-10-CM

## 2024-05-31 DIAGNOSIS — L08.9 INFECTED WOUND: Primary | ICD-10-CM

## 2024-05-31 PROCEDURE — 99284 EMERGENCY DEPT VISIT MOD MDM: CPT | Mod: 25

## 2024-05-31 PROCEDURE — 25000003 PHARM REV CODE 250: Performed by: PHYSICIAN ASSISTANT

## 2024-05-31 PROCEDURE — 99214 OFFICE O/P EST MOD 30 MIN: CPT | Mod: S$GLB,,, | Performed by: NURSE PRACTITIONER

## 2024-05-31 PROCEDURE — 63600175 PHARM REV CODE 636 W HCPCS: Performed by: PHYSICIAN ASSISTANT

## 2024-05-31 PROCEDURE — 96372 THER/PROPH/DIAG INJ SC/IM: CPT | Performed by: PHYSICIAN ASSISTANT

## 2024-05-31 RX ORDER — KETOROLAC TROMETHAMINE 30 MG/ML
30 INJECTION, SOLUTION INTRAMUSCULAR; INTRAVENOUS
Status: COMPLETED | OUTPATIENT
Start: 2024-05-31 | End: 2024-05-31

## 2024-05-31 RX ORDER — DOXYCYCLINE HYCLATE 100 MG
100 TABLET ORAL
Status: COMPLETED | OUTPATIENT
Start: 2024-05-31 | End: 2024-05-31

## 2024-05-31 RX ORDER — FAMOTIDINE 20 MG/1
20 TABLET, FILM COATED ORAL
Status: COMPLETED | OUTPATIENT
Start: 2024-05-31 | End: 2024-05-31

## 2024-05-31 RX ORDER — DOXYCYCLINE 100 MG/1
100 CAPSULE ORAL 2 TIMES DAILY
Qty: 14 CAPSULE | Refills: 0 | Status: SHIPPED | OUTPATIENT
Start: 2024-05-31 | End: 2024-06-07

## 2024-05-31 RX ORDER — DOXYCYCLINE 100 MG/1
100 CAPSULE ORAL 2 TIMES DAILY
Qty: 14 CAPSULE | Refills: 0 | Status: SHIPPED | OUTPATIENT
Start: 2024-05-31 | End: 2024-05-31

## 2024-05-31 RX ORDER — MUPIROCIN 20 MG/G
OINTMENT TOPICAL 2 TIMES DAILY
Qty: 30 G | Refills: 0 | Status: SHIPPED | OUTPATIENT
Start: 2024-05-31 | End: 2024-06-07

## 2024-05-31 RX ORDER — HYDROCODONE BITARTRATE AND ACETAMINOPHEN 5; 325 MG/1; MG/1
1 TABLET ORAL EVERY 6 HOURS PRN
Qty: 12 TABLET | Refills: 0 | Status: SHIPPED | OUTPATIENT
Start: 2024-05-31 | End: 2024-06-03

## 2024-05-31 RX ADMIN — DOXYCYCLINE HYCLATE 100 MG: 100 TABLET, COATED ORAL at 05:05

## 2024-05-31 RX ADMIN — KETOROLAC TROMETHAMINE 30 MG: 30 INJECTION, SOLUTION INTRAMUSCULAR at 03:05

## 2024-05-31 RX ADMIN — FAMOTIDINE 20 MG: 20 TABLET, FILM COATED ORAL at 03:05

## 2024-05-31 NOTE — ED PROVIDER NOTES
Encounter Date: 5/31/2024       History     Chief Complaint   Patient presents with    Sunburn     Patient is diabetic and has an infected wound on the left foot due to a bad sunburn      Brannon Severino is a 54 y.o. male presenting for evaluation of redness, swelling and persistent pain to bilateral feet since experiencing a sunburn while at the beach last week.  He has now noticed a wound to the top of his left foot, since the initial blister popped.  He states he is diabetic, but not insulin dependent.  No fever, no chills.  He is able to walk, but with increased pain in the feet.  He was evaluated at urgent care just prior to arrival and they recommended that he come to the emergency department for further evaluation of possible osteomyelitis.  Patient states that he has been applying Bactroban with little improvement.  He has a past medical history of COPD (chronic obstructive pulmonary disease), Diabetes mellitus, Hypertension, Pancreatitis, Psoriasis, and Stroke.      The history is provided by the patient and a caregiver.     Review of patient's allergies indicates:  No Known Allergies  Past Medical History:   Diagnosis Date    COPD (chronic obstructive pulmonary disease)     Diabetes mellitus     Hypertension     Pancreatitis     Psoriasis     Stroke      Past Surgical History:   Procedure Laterality Date    COLONOSCOPY N/A 1/11/2023    Procedure: COLONOSCOPY;  Surgeon: Kermit Maradiaga MD;  Location: [facility];  Service: Endoscopy;  Laterality: N/A;    TENDON RELEASE Right     right arm tendon repair and release      Family History   Problem Relation Name Age of Onset    Heart disease Mother      Diabetes Mother      Heart disease Father          CABG     Social History     Tobacco Use    Smoking status: Every Day     Current packs/day: 0.40     Average packs/day: 0.4 packs/day for 9.6 years (3.8 ttl pk-yrs)     Types: Cigarettes     Start date: 10/31/2014   Substance Use Topics    Alcohol use: Not  Currently     Comment: vodka 0.5,  2-6 beers a day    Drug use: Not Currently     Review of Systems   Constitutional:  Negative for chills and fever.   Musculoskeletal:  Positive for joint swelling and myalgias. Negative for arthralgias, back pain, neck pain and neck stiffness.   Skin:  Positive for color change and wound. Negative for pallor and rash.   Neurological:  Negative for weakness and numbness.   Hematological:  Does not bruise/bleed easily.       Physical Exam     Initial Vitals [05/31/24 1450]   BP Pulse Resp Temp SpO2   (!) 145/74 91 20 98.6 °F (37 °C) 97 %      MAP       --         Physical Exam    Nursing note and vitals reviewed.  Constitutional: He appears well-developed and well-nourished. He is not diaphoretic. No distress.   Cardiovascular:  Intact distal pulses.           Musculoskeletal:         General: Tenderness present. No edema. Normal range of motion.      Comments: Marked erythema and resolving sunburn noted to bilateral dorsal feet, left greater than right.  2 cm superficial wound noted to dorsal left foot.  No decreased range of motion, decreased strength or loss of sensation to bilateral lower extremities.  Palpable 2+ pedal pulses.     Neurological: He is alert and oriented to person, place, and time. He has normal strength. No sensory deficit.   Skin: Skin is warm and dry. No rash and no abscess noted. No erythema.   Psychiatric: He has a normal mood and affect.         ED Course   Procedures  Labs Reviewed - No data to display       Imaging Results              X-Ray Foot Complete Left (Final result)  Result time 05/31/24 16:57:18      Final result by Deny Sanchez MD (05/31/24 16:57:18)                   Narrative:    EXAMINATION:  XR FOOT COMPLETE 3 VIEW LEFT    CLINICAL HISTORY:  .  Cellulitis of left lower limb    TECHNIQUE:  AP, lateral and oblique views of the left foot were performed.    COMPARISON:  None    FINDINGS:  No acute fracture or traumatic malalignment.   Dorsal and plantar calcaneal spurs.  Scattered mild mid and forefoot degenerative change.  Dorsal foot soft tissue swelling.      Electronically signed by: Deny Sanchez  Date:    05/31/2024  Time:    16:57                                     Medications   ketorolac injection 30 mg (30 mg Intramuscular Given 5/31/24 1554)   famotidine tablet 20 mg (20 mg Oral Given 5/31/24 1554)   doxycycline tablet 100 mg (100 mg Oral Given 5/31/24 1712)     Medical Decision Making  Differential diagnosis:  Cellulitis  Partial-thickness sunburn  Osteomyelitis  Wound    Pt emergently evaluated here in the ED.    X-ray of the left foot shows no evidence for osteomyelitis.  Symptoms most consistent with partial-thickness sunburn and likely cellulitis to the left foot.  We will initiate oral antibiotics and discharged home to follow up with wound care.  He voices understanding and is agreeable with the plan.  He is given specific return precautions.    Amount and/or Complexity of Data Reviewed  Radiology: ordered. Decision-making details documented in ED Course.    Risk  OTC drugs.  Prescription drug management.  Diagnosis or treatment significantly limited by social determinants of health.              Attending Attestation:     Physician Attestation Statement for NP/PA:   I personally made/approved the management plan and take responsibility for the patient management.    Other NP/PA Attestation Additions:    History of Present Illness: 54-year-old male presented for evaluation of sunburn as well as a foot wound.    Medical Decision Making: Initial differential diagnosis included but not limited to sunburn, cellulitis, and diabetic wound.  I am in agreement with the physician assistant's  assessment, treatment, and plan of care.                                    Clinical Impression:  Final diagnoses:  [L03.116] Cellulitis of left foot  [L55.1] Sunburn of second degree (Primary)          ED Disposition Condition    Discharge Stable           ED Prescriptions       Medication Sig Dispense Start Date End Date Auth. Provider    doxycycline (VIBRAMYCIN) 100 MG Cap  (Status: Discontinued) Take 1 capsule (100 mg total) by mouth 2 (two) times daily. for 7 days 14 capsule 5/31/2024 5/31/2024 Amber Martin PA-C    doxycycline (VIBRAMYCIN) 100 MG Cap Take 1 capsule (100 mg total) by mouth 2 (two) times daily. for 7 days 14 capsule 5/31/2024 6/7/2024 Amber Martin PA-C    HYDROcodone-acetaminophen (NORCO) 5-325 mg per tablet Take 1 tablet by mouth every 6 (six) hours as needed for Pain. 12 tablet 5/31/2024 6/3/2024 Amber Martin PA-C    mupirocin (BACTROBAN) 2 % ointment Apply topically 2 (two) times daily. for 7 days 30 g 5/31/2024 6/7/2024 Amber Martin PA-C          Follow-up Information       Follow up With Specialties Details Why Contact Info Additional Information    [facility] Emergency Medicine  As needed, If symptoms worsen [address] [place] [address] [address] [place] [id number] 1st floor             Amber Martin PA-C  05/31/24 0039       Harlan Barnard MD  05/31/24 [id number]

## 2024-05-31 NOTE — PROGRESS NOTES
Subjective:      Patient ID: Brannon Severino is a 54 y.o. male.    Vitals:  weight is 77.1 kg (170 lb). His oral temperature is 98.6 °F (37 °C). His blood pressure is 128/85 and his pulse is 93. His respiration is 16 and oxygen saturation is 97%.     Chief Complaint: Sunburn    54-year-old male presents with reports of sunburn to his bilateral lower extremities.He reports going to the beach last weekend and that's when he got the sunburn. He states that his left foot had a large blister that popped a few days ago. He reports a wound to the top of his foot. He reports that he is a diabetic. He states that he is having severe pain to his right foot. He denies any known fever. He states that he has been putting Bactroban ointment to the wound with no relief.    Sunburn  Pertinent negatives include no fever.       Constitution: Negative for fever.   Skin:  Positive for wound and erythema.      Objective:     Physical Exam   Constitutional: He is oriented to person, place, and time.   HENT:   Head: Normocephalic and atraumatic.   Ears:   Right Ear: External ear normal.   Left Ear: External ear normal.   Mouth/Throat: Mucous membranes are moist.   Eyes: Pupils are equal, round, and reactive to light.   Cardiovascular: Normal rate.   Pulmonary/Chest: Effort normal.   Abdominal: Normal appearance. He exhibits no distension.   Musculoskeletal:         General: Tenderness present.   Neurological: He is alert and oriented to person, place, and time.   Skin: Capillary refill takes less than 2 seconds. erythema         Comments: 0reo4ue open infected wound from blister to anterior aspect of left foot. Tender. Mild swelling noted to left foot. 1+ pedal pulse. Cap refill < 3 sec   Psychiatric: His behavior is normal. Mood normal.       Assessment:     1. Infected wound    2. Sunburn, third degree        Plan:     Infected wound to left foot with history of diabetes. Discussed that further work up warranted to rule out severe  infection or osteomyelitis. Advised to have further evaluation in the ED. Patient verbalized understanding and stated that he would go to the ED. He denied EMS transport and stated he would take private vehicle.     Infected wound    Sunburn, third degree

## 2024-06-04 ENCOUNTER — OFFICE VISIT (OUTPATIENT)
Dept: WOUND CARE | Facility: HOSPITAL | Age: 54
End: 2024-06-04
Attending: PODIATRIST
Payer: MEDICAID

## 2024-06-04 VITALS
TEMPERATURE: 98 F | BODY MASS INDEX: 24.34 KG/M2 | SYSTOLIC BLOOD PRESSURE: 124 MMHG | HEART RATE: 84 BPM | DIASTOLIC BLOOD PRESSURE: 86 MMHG | RESPIRATION RATE: 16 BRPM | HEIGHT: 70 IN | WEIGHT: 170 LBS

## 2024-06-04 DIAGNOSIS — L97.522 ULCER OF LEFT FOOT WITH FAT LAYER EXPOSED: ICD-10-CM

## 2024-06-04 DIAGNOSIS — M79.672 FOOT PAIN, LEFT: Primary | ICD-10-CM

## 2024-06-04 DIAGNOSIS — L55.1 SUNBURN OF SECOND DEGREE: ICD-10-CM

## 2024-06-04 DIAGNOSIS — T31.0 BURNS INVOLVING LESS THAN 10% OF BODY SURFACE: ICD-10-CM

## 2024-06-04 PROCEDURE — 16020 DRESS/DEBRID P-THICK BURN S: CPT | Mod: PN | Performed by: PODIATRIST

## 2024-06-04 PROCEDURE — 99214 OFFICE O/P EST MOD 30 MIN: CPT | Mod: 25,PN | Performed by: PODIATRIST

## 2024-06-04 RX ORDER — HYDROCODONE BITARTRATE AND ACETAMINOPHEN 7.5; 325 MG/1; MG/1
1 TABLET ORAL EVERY 6 HOURS PRN
Qty: 15 TABLET | Refills: 0 | Status: SHIPPED | OUTPATIENT
Start: 2024-06-04

## 2024-06-05 NOTE — PROGRESS NOTES
[address] [facility] [address]  [place] [address]  Phone: [phone]   [phone]    Patient's PCP:Dorinda Mckenzie MD  Referring Provider: Amber Martin    Subjective:      Chief Complaint:: Wound Care, Wound Check, Wound Consult, and Foot Pain    HPI  Brannon Severino is a 54 y.o. male who presents today with a complaint of left dorsal foot ulcer.  See wound docs documentation for full assessment and evaluation of all wounds.     Reviewed all notes from patients medical chart from last 12 months, including imaging, procedures, studies, progress notes,  cultures, antibiotic regimens, photos,  etc.       EVALUATION, ASSESSMENT, & PLAN:     1. Debridement.See Wound Docs for assessment of wounds and procedure notes  2. Continue taking all medications as prescribed  3. Dressing changes, see Wound docs for dressing change orders  4. RTC one week   5. Counseled patient on increasing protein intake, not getting wound wet, keeping dressing clean dry and intact, following a healthy diet, elevating legs when able, removing pressure from wound    Total time spent for E&M 40  Total time for debridement 35 minutes     Proper ulcer care and the possible need for serial debridements, topical medications, specific dressings and biological engineered skin substitutes if indicated.    Patient should call the office immediately if any signs of infection, such as fever, chills, sweats, increased redness or pain.    Patient was instructed to call the clinic or go to the emergency department if their symptoms do not improve, worsens, or if new symptoms develop.  Patient was advised that if any increased swelling, pain, or numbness arise to go immediately to the ED. Patient knows to call any time if an emergency arises. Shared decision making occurred and patient verbalized understanding in agreement with this plan.       >50% of this > 60 minute visit was spent face to face educating/counseling the patient    I  "spent a total of 60 minutes on the day of the visit.This includes face to face time and non-face to face time preparing to see the patient (eg, review of tests), obtaining and/or reviewing separately obtained history, documenting clinical information in the electronic or other health record, independently interpreting results and communicating results to the patient/family/caregiver, or care coordinator.       Much of the documentation for this visit was completed in the Wound Docs system.  Please see the attached documentation for further details about the patient's care. Scanned under the Media tab.        Twila Segura DPM     Vitals:    06/04/24 1122   BP: 124/86   Pulse: 84   Resp: 16   Temp: 98.3 °F (36.8 °C)   Weight: 77.1 kg (169 lb 15.6 oz)   Height: 5' 10" (1.778 m)   PainSc:   9      Shoe Size:     Past Surgical History:   Procedure Laterality Date    COLONOSCOPY N/A 1/11/2023    Procedure: COLONOSCOPY;  Surgeon: Kermit Maradiaga MD;  Location: [facility];  Service: Endoscopy;  Laterality: N/A;    TENDON RELEASE Right     right arm tendon repair and release      Past Medical History:   Diagnosis Date    COPD (chronic obstructive pulmonary disease)     Diabetes mellitus     Hypertension     Pancreatitis     Psoriasis     Stroke      Family History   Problem Relation Name Age of Onset    Heart disease Mother      Diabetes Mother      Heart disease Father          CABG        Social History:   Marital Status: Single  Alcohol History:  reports that he does not currently use alcohol.  Tobacco History:  reports that he has been smoking cigarettes. He started smoking about 9 years ago. He has a 3.8 pack-year smoking history. He does not have any smokeless tobacco history on file.  Drug History:  reports that he does not currently use drugs.    Review of patient's allergies indicates:  No Known Allergies    Current Outpatient Medications   Medication Sig Dispense Refill    albuterol (PROAIR HFA) 90 mcg/actuation " inhaler ProAir HFA 90 mcg/actuation aerosol inhaler  2 puffs every 4 hours as  needed for wheeze and shortness of breath      amLODIPine (NORVASC) 5 MG tablet Take 1 tablet (5 mg total) by mouth once daily. 30 tablet 0    aspirin 81 MG Chew Take 1 tablet (81 mg total) by mouth once daily. 30 tablet 0    atorvastatin (LIPITOR) 80 MG tablet Take 1 tablet (80 mg total) by mouth every evening. 30 tablet 0    calcipotriene (DOVONOX) 0.005 % cream Apply twice daily to affected area of skin with steroid cream.      clobetasol 0.05% (TEMOVATE) 0.05 % Oint APPLY TO THICKEST PLAQUES ON ELBOWS AND LEGS UNDER OCCLUSION AT NIGHT WITH CLING WRAP      clopidogrel (PLAVIX) 75 mg tablet Take 1 tablet by mouth once daily.      diphenhydrAMINE (BENADRYL) 50 MG capsule Take 1 capsule (50 mg total) by mouth every 6 (six) hours as needed for Itching. 30 capsule 0    doxycycline (VIBRAMYCIN) 100 MG Cap Take 1 capsule (100 mg total) by mouth 2 (two) times daily. for 7 days 14 capsule 0    famotidine (PEPCID) 20 MG tablet Take 1 tablet (20 mg total) by mouth 2 (two) times daily. for 10 days 20 tablet 0    FLUoxetine 20 MG capsule Take 1 capsule (20 mg total) by mouth once daily. 30 capsule 0    gabapentin (NEURONTIN) 300 MG capsule Take 1 capsule (300 mg total) by mouth 3 (three) times daily. 90 capsule 0    hydroCHLOROthiazide (MICROZIDE) 12.5 mg capsule Take 1 capsule (12.5 mg total) by mouth once daily. (Patient not taking: Reported on 1/11/2023) 30 capsule 1    HYDROcodone-acetaminophen (NORCO) 7.5-325 mg per tablet Take 1 tablet by mouth every 6 (six) hours as needed for Pain. 15 tablet 0    levETIRAcetam (KEPPRA) 750 MG Tab Take 2 tablets (1,500 mg total) by mouth 2 (two) times daily. 120 tablet 0    lisinopril (PRINIVIL,ZESTRIL) 20 MG tablet Take 1 tablet by mouth once daily.      loratadine (CLARITIN) 10 mg tablet Take 1 tablet by mouth once daily.      metFORMIN (GLUCOPHAGE) 1000 MG tablet Take 1 tablet (1,000 mg total) by mouth 2  (two) times daily with meals. 60 tablet 0    metoprolol succinate (TOPROL-XL) 25 MG 24 hr tablet Take 1 tablet (25 mg total) by mouth once daily. 30 tablet 0    mirtazapine (REMERON) 15 MG tablet Take 1 tablet (15 mg total) by mouth every evening. 30 tablet 0    mupirocin (BACTROBAN) 2 % ointment Apply topically 2 (two) times daily. for 7 days 30 g 0     No current facility-administered medications for this visit.       Review of Systems   Constitutional:  Negative for chills, fatigue, fever and unexpected weight change.   HENT:  Negative for hearing loss and trouble swallowing.    Eyes:  Negative for photophobia and visual disturbance.   Respiratory:  Negative for cough, shortness of breath and wheezing.    Cardiovascular:  Negative for chest pain, palpitations and leg swelling.   Gastrointestinal:  Negative for abdominal pain and nausea.   Genitourinary:  Negative for dysuria and frequency.   Musculoskeletal:  Negative for arthralgias, back pain, gait problem, joint swelling and myalgias.   Skin:  Positive for wound. Negative for rash.   Neurological:  Negative for tremors, seizures, speech difficulty, weakness and headaches.   Hematological:  Does not bruise/bleed easily.         Objective:        Physical Exam:   Foot Exam  Physical Exam  Ortho/SPM Exam     Imaging:            Assessment:       1. Foot pain, left    2. Sunburn of second degree    3. Ulcer of left foot with fat layer exposed      Plan:   Foot pain, left  -     HYDROcodone-acetaminophen (NORCO) 7.5-325 mg per tablet; Take 1 tablet by mouth every 6 (six) hours as needed for Pain.  Dispense: 15 tablet; Refill: 0    Sunburn of second degree  -     Ambulatory referral/consult to Wound Clinic    Ulcer of left foot with fat layer exposed      Follow up in about 1 week (around 6/11/2024).    Procedures          Counseling:     I provided patient education verbally regarding:   Patient diagnosis, treatment options, as well as alternatives, risks, and  benefits.     This note was created using Dragon voice recognition software that occasionally misinterpreted phrases or words.

## 2024-06-10 ENCOUNTER — OFFICE VISIT (OUTPATIENT)
Dept: WOUND CARE | Facility: HOSPITAL | Age: 54
End: 2024-06-10
Attending: PODIATRIST
Payer: MEDICAID

## 2024-06-10 VITALS
WEIGHT: 170 LBS | DIASTOLIC BLOOD PRESSURE: 81 MMHG | BODY MASS INDEX: 24.34 KG/M2 | HEART RATE: 86 BPM | RESPIRATION RATE: 18 BRPM | SYSTOLIC BLOOD PRESSURE: 133 MMHG | HEIGHT: 70 IN | TEMPERATURE: 98 F

## 2024-06-10 DIAGNOSIS — L55.1 SUNBURN OF SECOND DEGREE: Primary | ICD-10-CM

## 2024-06-10 DIAGNOSIS — L97.522 ULCER OF LEFT FOOT WITH FAT LAYER EXPOSED: ICD-10-CM

## 2024-06-10 DIAGNOSIS — M79.672 FOOT PAIN, LEFT: ICD-10-CM

## 2024-06-10 PROCEDURE — 99213 OFFICE O/P EST LOW 20 MIN: CPT | Mod: PN | Performed by: PODIATRIST

## 2024-06-10 PROCEDURE — 99214 OFFICE O/P EST MOD 30 MIN: CPT | Mod: ,,, | Performed by: PODIATRIST

## 2024-06-10 PROCEDURE — 3079F DIAST BP 80-89 MM HG: CPT | Mod: CPTII,,, | Performed by: PODIATRIST

## 2024-06-10 PROCEDURE — 3075F SYST BP GE 130 - 139MM HG: CPT | Mod: CPTII,,, | Performed by: PODIATRIST

## 2024-06-10 PROCEDURE — 3008F BODY MASS INDEX DOCD: CPT | Mod: CPTII,,, | Performed by: PODIATRIST
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
## 2024-06-17 ENCOUNTER — OFFICE VISIT (OUTPATIENT)
Dept: WOUND CARE | Facility: HOSPITAL | Age: 54
End: 2024-06-17
Attending: PODIATRIST
Payer: MEDICAID

## 2024-06-17 VITALS
TEMPERATURE: 99 F | HEART RATE: 78 BPM | DIASTOLIC BLOOD PRESSURE: 73 MMHG | SYSTOLIC BLOOD PRESSURE: 133 MMHG | RESPIRATION RATE: 18 BRPM

## 2024-06-17 DIAGNOSIS — T31.0 BURNS INVOLVING LESS THAN 10% OF BODY SURFACE: ICD-10-CM

## 2024-06-17 DIAGNOSIS — L97.522 ULCER OF LEFT FOOT WITH FAT LAYER EXPOSED: ICD-10-CM

## 2024-06-17 DIAGNOSIS — L55.1 SUNBURN OF SECOND DEGREE: Primary | ICD-10-CM

## 2024-06-17 PROCEDURE — 99214 OFFICE O/P EST MOD 30 MIN: CPT | Mod: ,,, | Performed by: PODIATRIST

## 2024-06-17 PROCEDURE — 3078F DIAST BP <80 MM HG: CPT | Mod: CPTII,,, | Performed by: PODIATRIST

## 2024-06-17 PROCEDURE — 3075F SYST BP GE 130 - 139MM HG: CPT | Mod: CPTII,,, | Performed by: PODIATRIST

## 2024-06-17 PROCEDURE — 99213 OFFICE O/P EST LOW 20 MIN: CPT | Mod: PN | Performed by: PODIATRIST

## 2024-06-17 PROCEDURE — 1159F MED LIST DOCD IN RCRD: CPT | Mod: CPTII,,, | Performed by: PODIATRIST
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
## 2024-12-12 DIAGNOSIS — M25.511 RIGHT SHOULDER PAIN: Primary | ICD-10-CM

## 2024-12-19 ENCOUNTER — CLINICAL SUPPORT (OUTPATIENT)
Dept: REHABILITATION | Facility: HOSPITAL | Age: 54
End: 2024-12-19
Payer: MEDICAID

## 2024-12-19 DIAGNOSIS — R29.898 WEAKNESS OF BOTH SHOULDERS: ICD-10-CM

## 2024-12-19 DIAGNOSIS — R29.898 DECREASED ROM OF NECK: Primary | ICD-10-CM

## 2024-12-19 PROCEDURE — 97161 PT EVAL LOW COMPLEX 20 MIN: CPT | Mod: PN

## 2024-12-22 PROBLEM — R29.898 WEAKNESS OF BOTH SHOULDERS: Status: ACTIVE | Noted: 2024-12-22

## 2024-12-22 PROBLEM — R29.898 DECREASED ROM OF NECK: Status: ACTIVE | Noted: 2024-12-22

## 2024-12-24 NOTE — PLAN OF CARE
[facility]   Physical Therapy Initial Evaluation      Name: Brannon Severino  Clinic Number: [id number]    Therapy Diagnosis:   Encounter Diagnoses   Name Primary?    Decreased ROM of neck Yes    Weakness of both shoulders         Physician: Dorinda Mckeznie MD    Physician Orders: PT Eval and Treat   Medical Diagnosis from Referral: M25.511 (ICD-10-CM) - Right shoulder pain   Evaluation Date: 12/19/2024  Authorization Period Expiration: 12/12/2025  Plan of Care Expiration: 01/30/2025  Progress Note Due: 01/18/2025  Date of Surgery: N/A  Visit # / Visits authorized: 1/ 1   FOTO: 1/ 3    Precautions: Standard, Diabetes, and hx of stroke     Time In: 10:00  Time Out: 10:45  Total Billable Time: 45 minutes    Subjective     Date of onset: a few weeks or so     History of current condition - Brannon reports: He has been having pain on the Right side of his neck and shoulder and it has radiated down to his to his Right bicep. He had a stroke in the past. He notes that his Shoulder range of motion does not cause his pain. But when he does his active cervical range of motion his pain is reproduced. He wants to know what is causing his pain and if he needs surgery at this time.      Falls: none noted     Imaging: none:     Prior Therapy: yes, neuro Physical Therapy   Social History: lives with their family  Occupation: not working at this time  Prior Level of Function: limited with his ADLs, self care and leisurely task  Current Level of Function: as above     Pain:  Current 4/10, worst 6/10, best 1/10   Location: right cervical spine and shoulder    Description: Aching, Throbbing, and Sharp  Aggravating Factors: cervical range of motion   Easing Factors: rest    Patients goals: To figure out what is going on and treat it.     Medical History:   Past Medical History:   Diagnosis Date    COPD (chronic obstructive pulmonary disease)     Diabetes mellitus     Hypertension     Pancreatitis      Psoriasis     Stroke        Surgical History:   Brannon Severino  has a past surgical history that includes Tendon release (Right) and Colonoscopy (N/A, 1/11/2023).    Medications:   Brannon has a current medication list which includes the following prescription(s): albuterol, amlodipine, aspirin, atorvastatin, calcipotriene, clobetasol 0.05%, clopidogrel, diphenhydramine, famotidine, fluoxetine, gabapentin, hydrochlorothiazide, hydrocodone-acetaminophen, levetiracetam, lisinopril, loratadine, metformin, metoprolol succinate, and mirtazapine.    Allergies:   Review of patient's allergies indicates:  No Known Allergies     Objective      Observation: forward head posture, depressed shoulders     Posture: as above     Cervical Range of Motion:    Degrees Pain Normal   Flexion 50 N   80-90 deg   Extension 60 N   normal ROM: 70-80 deg   Right Rotation 65 N   70-90 degrees   Left Rotation 65 N   70-90 degrees   Right Side Bending 30 N 20-45 degrees   Left Side Bending 30 N 20-45 degrees      Shoulder Range of Motion:   Shoulder Left Right   Flexion 180 180   Abduction 180 180   ER 90 90   IR 50 50     Upper Extremity Strength  (R) UE  (L) UE    Shoulder flexion: 4/5 Shoulder flexion: 4/5   Shoulder Abduction: 4/5 Shoulder abduction: 4/5   Shoulder ER 4/5 Shoulder ER 4/5   Shoulder IR 4/5 Shoulder IR 4/5   Elbow flexion: 4/5 Elbow flexion: 4/5   Elbow extension: 4/5 Elbow extension: 4/5   Wrist flexion: 4/5 Wrist flexion: 4/5   Wrist extension: 4/5 Wrist extension: 4/5   Serratus Anterior 3-/5 Serratus Anterior  3-/5   Lower Trap 3-/5 Lower Trap 3-/5   Middle Trap 3-/5 Middle Trap 3-/5       Special Tests:  Distraction -   Spurlings -   Vertebral Artery -   Yousif -   Lateral Flexion Alar Ligament -   Transverse Ligament -   Shoulder Abduction Test -   Quadrant Testing -     Cervical joint mobility:    C0-C1 Flex/Ext 10 Flexion: 5 degrees  Extension: 10 degrees   Total: 15 degrees   C0-C1 Sidebending 5 5  degrees    C1-2 Rotation 20 35-40 degrees   C1-3 Rotation 30 60 degrees    Side glides  hypo Hyper/Normal/Hypo         Reflexes:  -Bicep (C5): not assessed  -Brachioradialis (C6): as above  -Tricep (C7): as above    Thoracic mobility: decreased thoracic spine PA     Palpation: no tenderness to palpation noted      Sensation: not intact to light touch in BUE     Neural tension: not assessed     Intake Outcome Measure for FOTO Neck Survey    Therapist reviewed FOTO scores for Brannon Severino on 12/19/2024.   FOTO report - see Media section or FOTO account episode details.    Intake Score: not given, patient not a good candidate          Treatment     Total Treatment time (time-based codes) separate from Evaluation: 11 minutes     Brannon received the treatments listed below:      therapeutic activities to improve functional performance for 11  minutes, including:  POC education  HEP education:  No monies with red theraband, x15  Wall slides x 10     Patient Education and Home Exercises     Education provided:   - POC education  - HEP education    Written Home Exercises Provided: Yes. Exercises were reviewed and Brannon was able to demonstrate them prior to the end of the session.  Brannon demonstrated fair  understanding of the education provided. See EMR under Patient Instructions for exercises provided during therapy sessions.    Assessment     Brannon is a 54 y.o. male referred to outpatient Physical Therapy with a medical diagnosis of M25.511 (ICD-10-CM) - Right shoulder pain . Patient presents with signs and symptoms consistent with cervical facet syndrome. Brannon has decreased cervical and thoracic spine range of motion. This, coupled with his periscapular weakness, has led him to overload his CT junction. He makes a good candidate for skilled Physical Therapy to improve the above listed deficits as well as develop a HEP.     Patient prognosis is Fair.   Patient will benefit from skilled  outpatient Physical Therapy to address the deficits stated above and in the chart below, provide patient /family education, and to maximize patientt's level of independence.     Plan of care discussed with patient: Yes  Patient's spiritual, cultural and educational needs considered and patient is agreeable to the plan of care and goals as stated below:     Anticipated Barriers for therapy: none noted at this time    Medical Necessity is demonstrated by the following  History  Co-morbidities and personal factors that may impact the plan of care [] LOW: no personal factors / co-morbidities  [x] MODERATE: 1-2 personal factors / co-morbidities  [] HIGH: 3+ personal factors / co-morbidities    Moderate / High Support Documentation:   Co-morbidities affecting plan of care: hx of stroke    Personal Factors:   no deficits     Examination  Body Structures and Functions, activity limitations and participation restrictions that may impact the plan of care [x] LOW: addressing 1-2 elements  [] MODERATE: 3+ elements  [] HIGH: 4+ elements (please support below)    Moderate / High Support Documentation:      Clinical Presentation [x] LOW: stable  [] MODERATE: Evolving  [] HIGH: Unstable     Decision Making/ Complexity Score: low       Goals:  Short Term Goals: 3 weeks. Pt agrees with goals set.  Pt will demonstrate independence and compliance with initial HEP to improve independence and symptom management.   Pt will report shoulder pain </= 2/10 with cervical spine range of motion to demonstrate improved condition and ability to complete ADLs, self care and leisurely task.    Pt will improve MMT of periscapular muscles to >/= 3/5 to improve tolerance for Adls, self care and leisurely task and decrease the demand on his cervical spine. .    Pt will improve cervical spine ROM by >= 25 % to improve tolerance for ADLs, self care and leisurely task.      Long Term Goals: 6 weeks. Pt agrees with goals set.   Pt will demonstrate  independence and compliance with final HEP to continue managing symptoms and developing mobility, motor control and strength.    Pt will report shoulder pain </= 0/10 with cervical spine range of motion to demonstrate improved condition and ability to complete his ADLs, self care and leisurely task.    Pt will improve MMT of scapular upward rotators to >/= 3+/5 to improve tolerance for static posture and decrease the demand to the thoracic spine.    Pt goal: To figure out what is going on and treat it.     Plan     Plan of care Certification: 12/19/2024 to 01/30/2025.    Outpatient Physical Therapy 2 times weekly for 6 weeks to include the following interventions: Cervical/Lumbar Traction, Electrical Stimulation NMES, Manual Therapy, Moist Heat/ Ice, Neuromuscular Re-ed, Patient Education, Self Care, Therapeutic Activities, and Therapeutic Exercise.     Ifeanyi Fuller, PT, DPT        Physician's Signature: _________________________________________ Date: ________________

## 2024-12-26 ENCOUNTER — CLINICAL SUPPORT (OUTPATIENT)
Dept: REHABILITATION | Facility: HOSPITAL | Age: 54
End: 2024-12-26
Payer: MEDICAID

## 2024-12-26 DIAGNOSIS — R29.898 DECREASED ROM OF NECK: Primary | ICD-10-CM

## 2024-12-26 DIAGNOSIS — R29.898 WEAKNESS OF BOTH SHOULDERS: ICD-10-CM

## 2024-12-26 PROCEDURE — 97110 THERAPEUTIC EXERCISES: CPT | Mod: PN

## 2024-12-26 NOTE — PROGRESS NOTES
[facility]   Physical Therapy Treatment Note     Name: Brannon Severino  Clinic Number: [id number]    Therapy Diagnosis:   Encounter Diagnoses   Name Primary?    Decreased ROM of neck Yes    Weakness of both shoulders      Physician: Dorinda Mckenzie MD    Visit Date: 12/26/2024    Physician Orders: PT Eval and Treat   Medical Diagnosis from Referral: M25.511 (ICD-10-CM) - Right shoulder pain   Evaluation Date: 12/19/2024  Authorization Period Expiration: 12/31/2024  Plan of Care Expiration: 01/30/2025  Progress Note Due: 01/18/2025  Date of Surgery: N/A  Visit # / Visits authorized: 1/ 20   FOTO: 1/ 3     Precautions: Standard, Diabetes, and hx of stroke     PTA Visit #: 0/5     FOTO first follow up:   FOTO second follow up:     Time In: 10:00  Time Out: 10:40  Total Billable Time: 40 minutes    SUBJECTIVE     Pt reports: His pain is on and off.  He was compliant with home exercise program.  Response to previous treatment: pain is no longer constant  Functional change: as above    Pain: 3/10  Location: R shoulder      OBJECTIVE     Objective Measures updated at progress report unless specified.     Treatment     Brannon received the treatments listed below:      therapeutic exercises to develop strength, endurance, ROM, and flexibility for 40 minutes including:  UBE 2'2'  Thoracic extension, x30  Open books x15 B   Wall slides with towel 3 x 10   Seated row machine 30#, 3 x 10  No money red theraband, 3 x 10  Seated d2 extension (shoulder B) with red theraband 3 x 10 B    Patient Education and Home Exercises     Home Exercises Provided and Patient Education Provided     Education provided:   - HEP education  - POC education    Written Home Exercises Provided: Patient instructed to cont prior HEP. Exercises were reviewed and Brannon was able to demonstrate them prior to the end of the session.  Brannon demonstrated fair  understanding of the education provided. See EMR under  Patient Instructions for exercises provided during therapy sessions    ASSESSMENT     Brannon tolerated therapy well. He requires verbal cueing and tactile to complete all interventions. Treatment focused on developing his spinal mobility and periscapular strength. Therapy will continue to focus on developing his mobility and strength in these areas.     Brannon Is progressing well towards his goals.   Pt prognosis is Fair.     Pt will continue to benefit from skilled outpatient physical therapy to address the deficits listed in the problem list box on initial evaluation, provide pt/family education and to maximize pt's level of independence in the home and community environment.     Pt's spiritual, cultural and educational needs considered and pt agreeable to plan of care and goals.     Anticipated barriers to physical therapy: transportation    Goals:  Short Term Goals: 3 weeks. Pt agrees with goals set.  Pt will demonstrate independence and compliance with initial HEP to improve independence and symptom management.   Pt will report shoulder pain </= 2/10 with cervical spine range of motion to demonstrate improved condition and ability to complete ADLs, self care and leisurely task.    Pt will improve MMT of periscapular muscles to >/= 3/5 to improve tolerance for Adls, self care and leisurely task and decrease the demand on his cervical spine. .    Pt will improve cervical spine ROM by >= 25 % to improve tolerance for ADLs, self care and leisurely task.       Long Term Goals: 6 weeks. Pt agrees with goals set.   Pt will demonstrate independence and compliance with final HEP to continue managing symptoms and developing mobility, motor control and strength.    Pt will report shoulder pain </= 0/10 with cervical spine range of motion to demonstrate improved condition and ability to complete his ADLs, self care and leisurely task.    Pt will improve MMT of scapular upward rotators to >/= 3+/5 to improve tolerance  for static posture and decrease the demand to the thoracic spine.    Pt goal: To figure out what is going on and treat it.      PLAN     Plan of care Certification: 12/19/2024 to 01/30/2025.     Outpatient Physical Therapy 2 times weekly for 6 weeks to include the following interventions: Cervical/Lumbar Traction, Electrical Stimulation NMES, Manual Therapy, Moist Heat/ Ice, Neuromuscular Re-ed, Patient Education, Self Care, Therapeutic Activities, and Therapeutic Exercise.    Ifeanyi Fuller, PT, DPT

## 2024-12-30 ENCOUNTER — CLINICAL SUPPORT (OUTPATIENT)
Dept: REHABILITATION | Facility: HOSPITAL | Age: 54
End: 2024-12-30
Payer: MEDICAID

## 2024-12-30 DIAGNOSIS — R29.898 WEAKNESS OF BOTH SHOULDERS: ICD-10-CM

## 2024-12-30 DIAGNOSIS — R29.898 DECREASED ROM OF NECK: Primary | ICD-10-CM

## 2024-12-30 PROCEDURE — 97110 THERAPEUTIC EXERCISES: CPT | Mod: PN,CQ

## 2024-12-30 NOTE — PROGRESS NOTES
[facility]   Physical Therapy Treatment Note     Name: Brannon Severino  Clinic Number: [id number]    Therapy Diagnosis:   Encounter Diagnoses   Name Primary?    Decreased ROM of neck Yes    Weakness of both shoulders      Physician: Dorinda Mckenzie MD    Visit Date: 12/30/2024    Physician Orders: PT Eval and Treat   Medical Diagnosis from Referral: M25.511 (ICD-10-CM) - Right shoulder pain   Evaluation Date: 12/19/2024  Authorization Period Expiration: 12/31/2024  Plan of Care Expiration: 01/30/2025  Progress Note Due: 01/18/2025  Date of Surgery: N/A  Visit # / Visits authorized: 2/ 20   FOTO: 1/ 3     Precautions: Standard, Diabetes, and hx of stroke     PTA Visit #: 0/5     FOTO first follow up:   FOTO second follow up:     Time In: 805  Time Out: 900  Total Billable Time: 40 minutes    SUBJECTIVE     Pt reports: His pain is on and off, tight in the mornings.   He was compliant with home exercise program.  Response to previous treatment: pain is no longer constant  Functional change: as above    Pain: 3/10  Location: R shoulder      OBJECTIVE     Objective Measures updated at progress report unless specified.     Treatment     Brannon received the treatments listed below:      therapeutic exercises to develop strength, endurance, ROM, and flexibility for 40 minutes including:    UBE 3'3'  Thoracic extension, x30  Open books x15 B   Wall slides with towel 3 x 10   Seated row machine 30#, 3 x 10  No money red theraband, 3 x 10  Seated d2 extension (shoulder B) with red theraband 3 x 10 B    Patient Education and Home Exercises     Home Exercises Provided and Patient Education Provided     Education provided:   - HEP education  - POC education    Written Home Exercises Provided: Patient instructed to cont prior HEP. Exercises were reviewed and Brannon was able to demonstrate them prior to the end of the session.  Brannon demonstrated fair  understanding of the education  provided. See EMR under Patient Instructions for exercises provided during therapy sessions    ASSESSMENT     Brannon tolerated therapy well. He requires verbal cueing and tactile to complete all interventions. Challenged with form and strength. Progress range of motion and strength as tolerated.     Brannon Is progressing well towards his goals.   Pt prognosis is Fair.     Pt will continue to benefit from skilled outpatient physical therapy to address the deficits listed in the problem list box on initial evaluation, provide pt/family education and to maximize pt's level of independence in the home and community environment.     Pt's spiritual, cultural and educational needs considered and pt agreeable to plan of care and goals.     Anticipated barriers to physical therapy: transportation    Goals:  Short Term Goals: 3 weeks. Pt agrees with goals set.  Pt will demonstrate independence and compliance with initial HEP to improve independence and symptom management.   Pt will report shoulder pain </= 2/10 with cervical spine range of motion to demonstrate improved condition and ability to complete ADLs, self care and leisurely task.    Pt will improve MMT of periscapular muscles to >/= 3/5 to improve tolerance for Adls, self care and leisurely task and decrease the demand on his cervical spine. .    Pt will improve cervical spine ROM by >= 25 % to improve tolerance for ADLs, self care and leisurely task.       Long Term Goals: 6 weeks. Pt agrees with goals set.   Pt will demonstrate independence and compliance with final HEP to continue managing symptoms and developing mobility, motor control and strength.    Pt will report shoulder pain </= 0/10 with cervical spine range of motion to demonstrate improved condition and ability to complete his ADLs, self care and leisurely task.    Pt will improve MMT of scapular upward rotators to >/= 3+/5 to improve tolerance for static posture and decrease the demand to the  thoracic spine.    Pt goal: To figure out what is going on and treat it.      PLAN     Plan of care Certification: 12/19/2024 to 01/30/2025.     Outpatient Physical Therapy 2 times weekly for 6 weeks to include the following interventions: Cervical/Lumbar Traction, Electrical Stimulation NMES, Manual Therapy, Moist Heat/ Ice, Neuromuscular Re-ed, Patient Education, Self Care, Therapeutic Activities, and Therapeutic Exercise.    Lucila Yu, PTA

## 2025-01-06 ENCOUNTER — CLINICAL SUPPORT (OUTPATIENT)
Dept: REHABILITATION | Facility: HOSPITAL | Age: 55
End: 2025-01-06
Payer: MEDICAID

## 2025-01-06 DIAGNOSIS — R29.898 DECREASED ROM OF NECK: Primary | ICD-10-CM

## 2025-01-06 DIAGNOSIS — R29.898 WEAKNESS OF BOTH SHOULDERS: ICD-10-CM

## 2025-01-06 PROCEDURE — 97110 THERAPEUTIC EXERCISES: CPT | Mod: PN,CQ

## 2025-01-07 ENCOUNTER — HOSPITAL ENCOUNTER (EMERGENCY)
Facility: HOSPITAL | Age: 55
Discharge: HOME | End: 2025-01-07
Attending: EMERGENCY MEDICINE
Payer: MEDICAID

## 2025-01-07 VITALS
SYSTOLIC BLOOD PRESSURE: 150 MMHG | OXYGEN SATURATION: 98 % | TEMPERATURE: 98 F | RESPIRATION RATE: 17 BRPM | HEIGHT: 70 IN | BODY MASS INDEX: 24.2 KG/M2 | HEART RATE: 93 BPM | WEIGHT: 169 LBS | DIASTOLIC BLOOD PRESSURE: 77 MMHG

## 2025-01-07 DIAGNOSIS — T14.90XA TRAUMA: ICD-10-CM

## 2025-01-07 DIAGNOSIS — G40.919 BREAKTHROUGH SEIZURE: Primary | ICD-10-CM

## 2025-01-07 DIAGNOSIS — R56.9 SEIZURE: ICD-10-CM

## 2025-01-07 DIAGNOSIS — S01.01XA LACERATION OF SCALP, INITIAL ENCOUNTER: ICD-10-CM

## 2025-01-07 DIAGNOSIS — Z78.9 ALCOHOL USE: ICD-10-CM

## 2025-01-07 LAB
ALBUMIN SERPL BCP-MCNC: 4.2 G/DL (ref 3.5–5.2)
ALP SERPL-CCNC: 113 U/L (ref 40–150)
ALT SERPL W/O P-5'-P-CCNC: 16 U/L (ref 10–44)
AMPHET+METHAMPHET UR QL: NEGATIVE
ANION GAP SERPL CALC-SCNC: 14 MMOL/L (ref 8–16)
AST SERPL-CCNC: 23 U/L (ref 10–40)
BACTERIA #/AREA URNS HPF: NORMAL /HPF
BARBITURATES UR QL SCN>200 NG/ML: NEGATIVE
BASOPHILS # BLD AUTO: 0.09 K/UL (ref 0–0.2)
BASOPHILS NFR BLD: 0.6 % (ref 0–1.9)
BENZODIAZ UR QL SCN>200 NG/ML: NEGATIVE
BILIRUB SERPL-MCNC: 0.4 MG/DL (ref 0.1–1)
BILIRUB UR QL STRIP: NEGATIVE
BUN SERPL-MCNC: 11 MG/DL (ref 6–20)
BZE UR QL SCN: NEGATIVE
CALCIUM SERPL-MCNC: 9.9 MG/DL (ref 8.7–10.5)
CANNABINOIDS UR QL SCN: ABNORMAL
CHLORIDE SERPL-SCNC: 100 MMOL/L (ref 95–110)
CLARITY UR: CLEAR
CO2 SERPL-SCNC: 23 MMOL/L (ref 23–29)
COLOR UR: YELLOW
CREAT SERPL-MCNC: 1.2 MG/DL (ref 0.5–1.4)
CREAT UR-MCNC: 72.9 MG/DL (ref 23–375)
DIFFERENTIAL METHOD BLD: ABNORMAL
EOSINOPHIL # BLD AUTO: 0 K/UL (ref 0–0.5)
EOSINOPHIL NFR BLD: 0.1 % (ref 0–8)
ERYTHROCYTE [DISTWIDTH] IN BLOOD BY AUTOMATED COUNT: 15.9 % (ref 11.5–14.5)
EST. GFR  (NO RACE VARIABLE): >60 ML/MIN/1.73 M^2
GLUCOSE SERPL-MCNC: 151 MG/DL (ref 70–110)
GLUCOSE UR QL STRIP: ABNORMAL
HCT VFR BLD AUTO: 42.5 % (ref 40–54)
HGB BLD-MCNC: 14.2 G/DL (ref 14–18)
HGB UR QL STRIP: ABNORMAL
HYALINE CASTS #/AREA URNS LPF: 0 /LPF
IMM GRANULOCYTES # BLD AUTO: 0.06 K/UL (ref 0–0.04)
IMM GRANULOCYTES NFR BLD AUTO: 0.4 % (ref 0–0.5)
KETONES UR QL STRIP: ABNORMAL
LEUKOCYTE ESTERASE UR QL STRIP: NEGATIVE
LYMPHOCYTES # BLD AUTO: 0.8 K/UL (ref 1–4.8)
LYMPHOCYTES NFR BLD: 5.3 % (ref 18–48)
MAGNESIUM SERPL-MCNC: 2.1 MG/DL (ref 1.6–2.6)
MCH RBC QN AUTO: 27.5 PG (ref 27–31)
MCHC RBC AUTO-ENTMCNC: 33.4 G/DL (ref 32–36)
MCV RBC AUTO: 82 FL (ref 82–98)
METHADONE UR QL SCN>300 NG/ML: NEGATIVE
MICROSCOPIC COMMENT: NORMAL
MONOCYTES # BLD AUTO: 0.9 K/UL (ref 0.3–1)
MONOCYTES NFR BLD: 5.9 % (ref 4–15)
NEUTROPHILS # BLD AUTO: 12.9 K/UL (ref 1.8–7.7)
NEUTROPHILS NFR BLD: 87.7 % (ref 38–73)
NITRITE UR QL STRIP: NEGATIVE
NRBC BLD-RTO: 0 /100 WBC
OPIATES UR QL SCN: NEGATIVE
PCP UR QL SCN>25 NG/ML: NEGATIVE
PH UR STRIP: 6 [PH] (ref 5–8)
PLATELET # BLD AUTO: 359 K/UL (ref 150–450)
PMV BLD AUTO: 8.6 FL (ref 9.2–12.9)
POTASSIUM SERPL-SCNC: 5.1 MMOL/L (ref 3.5–5.1)
PROT SERPL-MCNC: 7.8 G/DL (ref 6–8.4)
PROT UR QL STRIP: ABNORMAL
RBC # BLD AUTO: 5.16 M/UL (ref 4.6–6.2)
RBC #/AREA URNS HPF: 1 /HPF (ref 0–4)
SODIUM SERPL-SCNC: 137 MMOL/L (ref 136–145)
SP GR UR STRIP: 1.02 (ref 1–1.03)
SQUAMOUS #/AREA URNS HPF: 0 /HPF
TOXICOLOGY INFORMATION: ABNORMAL
TROPONIN I SERPL DL<=0.01 NG/ML-MCNC: 0.03 NG/ML (ref 0–0.03)
TROPONIN I SERPL DL<=0.01 NG/ML-MCNC: 0.04 NG/ML (ref 0–0.03)
URN SPEC COLLECT METH UR: ABNORMAL
UROBILINOGEN UR STRIP-ACNC: NEGATIVE EU/DL
WBC # BLD AUTO: 14.74 K/UL (ref 3.9–12.7)
WBC #/AREA URNS HPF: 4 /HPF (ref 0–5)
YEAST URNS QL MICRO: NORMAL

## 2025-01-07 PROCEDURE — 99285 EMERGENCY DEPT VISIT HI MDM: CPT | Mod: 25

## 2025-01-07 PROCEDURE — 83735 ASSAY OF MAGNESIUM: CPT | Performed by: EMERGENCY MEDICINE

## 2025-01-07 PROCEDURE — 25000003 PHARM REV CODE 250: Performed by: EMERGENCY MEDICINE

## 2025-01-07 PROCEDURE — 90714 TD VACC NO PRESV 7 YRS+ IM: CPT | Performed by: EMERGENCY MEDICINE

## 2025-01-07 PROCEDURE — 80053 COMPREHEN METABOLIC PANEL: CPT | Performed by: EMERGENCY MEDICINE

## 2025-01-07 PROCEDURE — 96365 THER/PROPH/DIAG IV INF INIT: CPT

## 2025-01-07 PROCEDURE — 96375 TX/PRO/DX INJ NEW DRUG ADDON: CPT

## 2025-01-07 PROCEDURE — 12002 RPR S/N/AX/GEN/TRNK2.6-7.5CM: CPT

## 2025-01-07 PROCEDURE — 93010 ELECTROCARDIOGRAM REPORT: CPT | Mod: ,,, | Performed by: INTERNAL MEDICINE

## 2025-01-07 PROCEDURE — 85025 COMPLETE CBC W/AUTO DIFF WBC: CPT | Performed by: EMERGENCY MEDICINE

## 2025-01-07 PROCEDURE — 81000 URINALYSIS NONAUTO W/SCOPE: CPT | Performed by: EMERGENCY MEDICINE

## 2025-01-07 PROCEDURE — 84484 ASSAY OF TROPONIN QUANT: CPT | Mod: 91 | Performed by: EMERGENCY MEDICINE

## 2025-01-07 PROCEDURE — 80177 DRUG SCRN QUAN LEVETIRACETAM: CPT | Performed by: EMERGENCY MEDICINE

## 2025-01-07 PROCEDURE — 63600175 PHARM REV CODE 636 W HCPCS: Performed by: EMERGENCY MEDICINE

## 2025-01-07 PROCEDURE — 36415 COLL VENOUS BLD VENIPUNCTURE: CPT | Performed by: EMERGENCY MEDICINE

## 2025-01-07 PROCEDURE — 93005 ELECTROCARDIOGRAM TRACING: CPT

## 2025-01-07 PROCEDURE — 80307 DRUG TEST PRSMV CHEM ANLYZR: CPT | Performed by: EMERGENCY MEDICINE

## 2025-01-07 PROCEDURE — 90471 IMMUNIZATION ADMIN: CPT | Performed by: EMERGENCY MEDICINE

## 2025-01-07 RX ORDER — LORAZEPAM 2 MG/ML
2 INJECTION INTRAMUSCULAR
Status: COMPLETED | OUTPATIENT
Start: 2025-01-07 | End: 2025-01-07

## 2025-01-07 RX ORDER — THIAMINE HCL 100 MG
100 TABLET ORAL DAILY
Status: DISCONTINUED | OUTPATIENT
Start: 2025-01-07 | End: 2025-01-07 | Stop reason: HOSPADM

## 2025-01-07 RX ORDER — ONDANSETRON HYDROCHLORIDE 2 MG/ML
4 INJECTION, SOLUTION INTRAVENOUS
Status: COMPLETED | OUTPATIENT
Start: 2025-01-07 | End: 2025-01-07

## 2025-01-07 RX ORDER — LEVETIRACETAM 1000 MG/1
1000 TABLET ORAL 2 TIMES DAILY
Qty: 60 TABLET | Refills: 0 | Status: SHIPPED | OUTPATIENT
Start: 2025-01-07 | End: 2025-02-06

## 2025-01-07 RX ORDER — LIDOCAINE HYDROCHLORIDE 10 MG/ML
10 INJECTION, SOLUTION INFILTRATION; PERINEURAL
Status: COMPLETED | OUTPATIENT
Start: 2025-01-07 | End: 2025-01-07

## 2025-01-07 RX ORDER — FOLIC ACID 1 MG/1
1 TABLET ORAL ONCE
Status: COMPLETED | OUTPATIENT
Start: 2025-01-07 | End: 2025-01-07

## 2025-01-07 RX ORDER — ACETAMINOPHEN 500 MG
1000 TABLET ORAL
Status: COMPLETED | OUTPATIENT
Start: 2025-01-07 | End: 2025-01-07

## 2025-01-07 RX ADMIN — ONDANSETRON 4 MG: 2 INJECTION INTRAMUSCULAR; INTRAVENOUS at 12:01

## 2025-01-07 RX ADMIN — LORAZEPAM 2 MG: 2 INJECTION INTRAMUSCULAR; INTRAVENOUS at 12:01

## 2025-01-07 RX ADMIN — LIDOCAINE HYDROCHLORIDE 10 ML: 10 INJECTION, SOLUTION INFILTRATION; PERINEURAL at 02:01

## 2025-01-07 RX ADMIN — THIAMINE HCL TAB 100 MG 100 MG: 100 TAB at 12:01

## 2025-01-07 RX ADMIN — ACETAMINOPHEN 1000 MG: 500 TABLET ORAL at 03:01

## 2025-01-07 RX ADMIN — CLOSTRIDIUM TETANI TOXOID ANTIGEN (FORMALDEHYDE INACTIVATED) AND CORYNEBACTERIUM DIPHTHERIAE TOXOID ANTIGEN (FORMALDEHYDE INACTIVATED) 0.5 ML: 5; 2 INJECTION, SUSPENSION INTRAMUSCULAR at 12:01

## 2025-01-07 RX ADMIN — FOLIC ACID 1 MG: 1 TABLET ORAL at 12:01

## 2025-01-07 RX ADMIN — LEVETIRACETAM 1000 MG: 100 INJECTION, SOLUTION INTRAVENOUS at 12:01

## 2025-01-07 NOTE — ED NOTES
Laceration to the head post fall from a seizure this morning around 0800.  Patient does not know what he hit his head on.  Does not know if he had +LOC.  He does take Plavix and ASA.

## 2025-01-07 NOTE — ED PROVIDER NOTES
Encounter Date: 1/7/2025       History     Chief Complaint   Patient presents with    Head Injury     Had seizure and fell and hit head    Seizures     54-year-old male past medical history of COPD, hypertension, diabetes, pancreatitis, stroke with left arm weakness, seizures on Keppra, presents emergency department with a seizure.  Patient reports that he felt a seizure coming on today and he had a seizure.  He says that he can always feel when it comes on.  He has not had 1 in over a year.  He does admit to drinking alcohol daily.  Last drink was sometime last night.  He denies any lack of sleep.  He says he has been compliant with his Keppra medication.  He reports that this morning his left arm started to go up and then he went into a seizure.  He does not remember anything else.  He hit his head either on the bed frame or on the floor.  He is unsure when his last tetanus vaccine was.  He sustained a laceration to the back of his head with good amount of bleeding.  It is reported he is on aspirin and Plavix.      Review of patient's allergies indicates:  No Known Allergies  Past Medical History:   Diagnosis Date    COPD (chronic obstructive pulmonary disease)     Diabetes mellitus     Hypertension     Pancreatitis     Psoriasis     Stroke      Past Surgical History:   Procedure Laterality Date    COLONOSCOPY N/A 1/11/2023    Procedure: COLONOSCOPY;  Surgeon: Kermit Maradiaga MD;  Location: [facility];  Service: Endoscopy;  Laterality: N/A;    TENDON RELEASE Right     right arm tendon repair and release      Family History   Problem Relation Name Age of Onset    Heart disease Mother      Diabetes Mother      Heart disease Father          CABG     Social History     Tobacco Use    Smoking status: Every Day     Current packs/day: 0.40     Average packs/day: 0.4 packs/day for 10.2 years (4.1 ttl pk-yrs)     Types: Cigarettes     Start date: 10/31/2014   Substance Use Topics    Alcohol use: Not Currently     Comment:  vodka 0.5,  2-6 beers a day    Drug use: Not Currently     Review of Systems   Constitutional:  Negative for chills and fever.   HENT:  Negative for sore throat.    Respiratory:  Negative for shortness of breath.    Cardiovascular:  Negative for chest pain.   Gastrointestinal:  Negative for nausea and vomiting.   Genitourinary:  Negative for dysuria.   Musculoskeletal:  Negative for back pain.   Skin:  Negative for rash.   Neurological:  Positive for seizures. Negative for weakness.   Hematological:  Does not bruise/bleed easily.   All other systems reviewed and are negative.      Physical Exam     Initial Vitals [01/07/25 1110]   BP Pulse Resp Temp SpO2   (!) 153/76 110 17 98.1 °F (36.7 °C) 95 %      MAP       --         Physical Exam    Nursing note and vitals reviewed.  Constitutional: He appears well-developed and well-nourished. He is not diaphoretic. No distress.   HENT:   Head: Normocephalic. Mouth/Throat: Oropharynx is clear and moist. No oropharyngeal exudate.   Scalp hematoma high parietal left-sided laceration present 3 cm in length stellate in nature.  Patient is edentulous   Eyes: Conjunctivae and EOM are normal. Pupils are equal, round, and reactive to light.   Neck: Neck supple. No tracheal deviation present.   Normal range of motion.  Cardiovascular:  Normal rate, regular rhythm, normal heart sounds and intact distal pulses.           No murmur heard.  Pulmonary/Chest: Breath sounds normal. No respiratory distress. He has no wheezes. He has no rhonchi. He has no rales.   Abdominal: Abdomen is soft. Bowel sounds are normal. He exhibits no distension. There is no abdominal tenderness. There is no rebound and no guarding.   Musculoskeletal:         General: No tenderness or edema. Normal range of motion.      Cervical back: Normal range of motion and neck supple.     Neurological: He is alert and oriented to person, place, and time. He has normal strength. No cranial nerve deficit or sensory deficit.  GCS score is 15. GCS eye subscore is 4. GCS verbal subscore is 5. GCS motor subscore is 6.   Mild left arm weakness when compared to the right which is chronic for the patient from prior stroke.  Speech is normal no facial droop.   Skin: Skin is warm and dry. Capillary refill takes less than 2 seconds. No rash noted. No erythema. No pallor.   Psychiatric: He has a normal mood and affect. His behavior is normal. Thought content normal.         ED Course   Lac Repair    Date/Time: 1/7/2025 11:06 AM    Performed by: Everton Soriano DO  Authorized by: Everton Soriano DO    Consent:     Consent obtained:  Verbal    Consent given by:  Patient    Risks, benefits, and alternatives were discussed: yes      Risks discussed:  Need for additional repair, nerve damage and infection    Alternatives discussed:  No treatment  Universal protocol:     Patient identity confirmed:  Verbally with patient  Anesthesia:     Anesthesia method:  Local infiltration    Local anesthetic:  Lidocaine 1% w/o epi  Laceration details:     Location:  Scalp    Length (cm):  3  Pre-procedure details:     Preparation:  Patient was prepped and draped in usual sterile fashion  Exploration:     Limited defect created (wound extended): yes      Hemostasis achieved with:  Direct pressure    Imaging outcome: foreign body not noted      Wound exploration: wound explored through full range of motion      Contaminated: no    Treatment:     Area cleansed with:  Saline    Amount of cleaning:  Standard    Irrigation solution:  Sterile saline    Visualized foreign bodies/material removed: no      Debridement:  None    Undermining:  None    Scar revision: no    Skin repair:     Repair method:  Staples    Number of staples:  5  Approximation:     Approximation:  Close  Repair type:     Repair type:  Simple  Post-procedure details:     Dressing:  Open (no dressing)    Procedure completion:  Tolerated well, no immediate complications    Labs Reviewed   CBC W/ AUTO  DIFFERENTIAL - Abnormal       Result Value    WBC 14.74 (*)     RBC 5.16      Hemoglobin 14.2      Hematocrit 42.5      MCV 82      MCH 27.5      MCHC 33.4      RDW 15.9 (*)     Platelets 359      MPV 8.6 (*)     Immature Granulocytes 0.4      Gran # (ANC) 12.9 (*)     Immature Grans (Abs) 0.06 (*)     Lymph # 0.8 (*)     Mono # 0.9      Eos # 0.0      Baso # 0.09      nRBC 0      Gran % 87.7 (*)     Lymph % 5.3 (*)     Mono % 5.9      Eosinophil % 0.1      Basophil % 0.6      Differential Method Automated     COMPREHENSIVE METABOLIC PANEL - Abnormal    Sodium 137      Potassium 5.1      Chloride 100      CO2 23      Glucose 151 (*)     BUN 11      Creatinine 1.2      Calcium 9.9      Total Protein 7.8      Albumin 4.2      Total Bilirubin 0.4      Alkaline Phosphatase 113      AST 23      ALT 16      eGFR >60      Anion Gap 14     DRUG SCREEN PANEL, URINE EMERGENCY - Abnormal    Benzodiazepines Negative      Methadone metabolites Negative      Cocaine (Metab.) Negative      Opiate Scrn, Ur Negative      Barbiturate Screen, Ur Negative      Amphetamine Screen, Ur Negative      THC Presumptive Positive (*)     Phencyclidine Negative      Creatinine, Urine 72.9      Toxicology Information SEE COMMENT      Narrative:     Specimen Source->Urine   TROPONIN I - Abnormal    Troponin I 0.033 (*)    URINALYSIS, REFLEX TO URINE CULTURE - Abnormal    Specimen UA Urine, Unspecified      Color, UA Yellow      Appearance, UA Clear      pH, UA 6.0      Specific Gravity, UA 1.020      Protein, UA 1+ (*)     Glucose, UA 4+ (*)     Ketones, UA Trace (*)     Bilirubin (UA) Negative      Occult Blood UA Trace (*)     Nitrite, UA Negative      Urobilinogen, UA Negative      Leukocytes, UA Negative      Narrative:     Specimen Source->Urine   TROPONIN I - Abnormal    Troponin I 0.037 (*)    MAGNESIUM    Magnesium 2.1     URINALYSIS MICROSCOPIC    RBC, UA 1      WBC, UA 4      Bacteria Rare      Yeast, UA None      Squam Epithel, UA 0       Hyaline Casts, UA 0      Microscopic Comment SEE COMMENT      Narrative:     Specimen Source->Urine   LEVETIRACETAM  (KEPPRA) LEVEL   ALCOHOL,MEDICAL (ETHANOL)          Imaging Results              CT Cervical Spine Without Contrast (Final result)  Result time 01/07/25 13:09:43      Final result by Harmeet Menon MD (01/07/25 13:09:43)                   Impression:      1. There is multilevel degenerative disc and facet disease.  These findings are discussed in detail by level above and present in the setting of a spinal canal which appears somewhat small on a developmental basis.  The result is some degree of spinal canal, lateral recess and/or foraminal stenosis at multiple levels.  There is, however, no acute fracture or traumatic malalignment.      Electronically signed by: Harmeet Menon MD  Date:    01/07/2025  Time:    13:09               Narrative:    EXAMINATION:  CT CERVICAL SPINE WITHOUT CONTRAST    CLINICAL HISTORY:  Neck trauma, mechanically unstable spine (Age >= 16y)    TECHNIQUE:  Low dose axial images, sagittal and coronal reformations were preformed though the cervical spine.  Contrast was not administered.    COMPARISON:  None    FINDINGS:  Vertebral column: The patient is moderately rotated in the scanner.  There is multilevel degenerative change.  The cervical vertebral bodies maintain normal height.  There is no acute fracture.  There is disc space narrowing most apparent at the C5-6 and C6-7 levels where there is endplate osteophyte formation.  The odontoid process is intact.  The anterior and posterior arches of C1 are intact as well.  There is multilevel facet joint arthropathy.  The facet joints maintain normal articulation.    Spinal canal, cord, epidural space: The spinal canal appears somewhat small on a developmental basis.  There is no obvious abnormal epidural mass or fluid collection.    Findings by level:    C1-2: Alignment is normal.  The joint spaces are  preserved    C2-3: There is at least moderate left foraminal stenosis due to left facet joint arthropathy and mild uncovertebral spurring.  There is a mild disc bulge.  There is borderline to mild spinal stenosis without cord compression.    C3-4: There is left greater than right facet joint arthropathy with mild uncovertebral spurring as well as a mild disc osteophyte complex.  There is mild spinal canal and bilateral foraminal stenosis.    C4-5: There is bilateral facet joint arthropathy with mild uncovertebral spurring as well as a mild disc bulge.  There is mild left foraminal stenosis with borderline spinal stenosis.    C5-6: There is disc space narrowing, bilateral facet joint arthropathy with mild uncovertebral spurring.  There is a disc protrusion/osteophyte eccentric to the right.  There is right lateral recess and foraminal stenosis with mild spinal stenosis.    C6-7: There is disc space narrowing.  There is a broad disc osteophyte complex, mild bilateral facet joint arthropathy and uncovertebral spurring.  There is left lateral recess and proximal foraminal stenosis with mild to moderate spinal stenosis.    C7-T1: There is mild bilateral facet joint arthropathy.  There is no spinal canal or significant foraminal stenosis suspected.    Soft tissues, other: The patient has undergone previous right carotid endarterectomy.  There is a left carotid stent.  The airway is patent.  The prevertebral soft tissues are normal.  The thyroid gland appears mildly prominent.  There is scarring in the left lung apex.                                       CT Head Without Contrast (Final result)  Result time 01/07/25 13:02:31      Final result by Harmeet Menon MD (01/07/25 13:02:31)                   Impression:      1. There is a high posterior left parietal scalp hematoma without underlying fracture.  2. There is encephalomalacia from remote right middle cerebral artery vascular territory infarction but there is  no acute intracranial abnormality.  There is no hemorrhage.      Electronically signed by: Harmeet Menon MD  Date:    01/07/2025  Time:    13:02               Narrative:    EXAMINATION:  CT HEAD WITHOUT CONTRAST    CLINICAL HISTORY:  Seizure, new-onset, no history of trauma;    TECHNIQUE:  Routine unenhanced axial images were obtained through the head.  Sagittal and coronal reformatted images were created.  The study is reviewed in bone and soft tissue windows.    COMPARISON:  Head CT dated 11/13/2021    FINDINGS:  Intracranial contents: There is no acute abnormality.  Redemonstrated is extensive encephalomalacia in the right middle cerebral artery vascular territory from remote infarction.  There is ex vacuo dilatation of the right lateral ventricle.  There is no intracranial hemorrhage or obvious acute infarction.  There is no abnormal extra-axial fluid collection.  The basilar cisterns are open.  The cerebellar tonsils remain in normal position.  Sellar structures are normal.    Extracranial contents, calvarium, soft tissues: There is a high posterior left parietal scalp hematoma without underlying fracture.  The calvarium is normal.  The included paranasal sinuses and mastoid air cells are clear.  There is an old left orbit floor deformity.                                       X-Ray Chest 1 View (Final result)  Result time 01/07/25 12:56:54      Final result by Agusto Kaba MD (01/07/25 12:56:54)                   Impression:      No acute process.  Postoperative change.      Electronically signed by: Agusto Kaba MD  Date:    01/07/2025  Time:    12:56               Narrative:    EXAMINATION:  XR CHEST 1 VIEW    CLINICAL HISTORY:  Injury, unspecified, initial encounter    TECHNIQUE:  Single frontal view of the chest was performed.    COMPARISON:  11/13/2021    FINDINGS:  Surgical clips are present in the left hilar region.  The cardiomediastinal silhouette is within normal limits.  The lungs  are well expanded without consolidation or pleural effusion.                                       Medications   thiamine tablet 100 mg (100 mg Oral Given 1/7/25 1222)   ondansetron injection 4 mg (4 mg Intravenous Given 1/7/25 1221)   levETIRAcetam (Keppra) 1,000 mg in 0.9% NaCl 100 mL IVPB (0 mg Intravenous Stopped 1/7/25 1252)   LORazepam injection 2 mg (2 mg Intravenous Given 1/7/25 1221)   folic acid tablet 1 mg (1 mg Oral Given 1/7/25 1222)   Td (Tenivac) IM vaccine (>/= 6 yo) (0.5 mLs Intramuscular Given 1/7/25 1222)   LIDOcaine HCL 10 mg/ml (1%) injection 10 mL (10 mLs Infiltration Given 1/7/25 1445)   acetaminophen tablet 1,000 mg (1,000 mg Oral Given 1/7/25 1551)     Medical Decision Making  Differential includes but not limited to seizure, syncope, intracranial hemorrhage skull fracture, intracranial mass lesion,    Emergent evaluation of a 54-year-old male presents emergency department with a seizure.  Patient initially stated compliance with a seizure medication but then on repeat evaluation says he might have missed a couple doses at night.  He does drink alcohol at night.  Initially seemed to have some mild tremors so given the seizure and tremors concern for possible alcohol withdrawal mild.  Patient received 2 mg of Ativan, thiamine folic acid, received more IV Keppra.  Patient was evaluated in the ER and head CT scan of the head and cervical spine which did not show any acute pathology.  He had his laceration repaired at bedside.  In addition he had labs which showed some mild leukocytosis which I suspect is secondary to his seizure.  Had mildly elevated troponin but no EKG changes.  He is not have any chest pain or shortness of breath.  I do not feel that this is arrhythmia or ACS.  On reassessment patient has no chest pain or shortness of breath.  I will recommend outpatient follow up for this.  Patient said his Keppra was decreased from 1000 at night to 500 maybe about a month and a half ago.  I  will recommend he go up to 1000 in the morning and 1000 at night.  Did give him the name of Dr. Bennett from Neurology for follow up.  Patient will be discharged home.  On re-evaluation he is not seem to be exhibiting any signs of alcohol withdrawal.    A dictation software program was used for this note.  Please expect some simple typographical  errors in this note.    I had a detailed discussion with the patient and/or guardian regarding: The historical points, exam findings, and diagnostic results supporting the discharge diagnosis, lab results, pertinent radiology results, and the need for outpatient follow-up, for definitive care with a family practitioner and to return to the emergency department if symptoms worsen or persist or if there are any questions or concerns that arise at home. All questions have been answered in detail. Strict return to Emergency Department precautions have been provided.       Amount and/or Complexity of Data Reviewed  External Data Reviewed: labs and notes.  Labs: ordered. Decision-making details documented in ED Course.  Radiology: ordered and independent interpretation performed. Decision-making details documented in ED Course.  ECG/medicine tests: ordered and independent interpretation performed. Decision-making details documented in ED Course.    Risk  OTC drugs.  Prescription drug management.  Decision regarding hospitalization.               ED Course as of 01/07/25 1730   Tue Jan 07, 2025   1156 EKG 11:53 a.m. normal sinus rhythm rate of 99.  No ST elevation or depression.  No evidence of ischemia.  Normal intervals.  No STEMI.  EKG interpreted independently by me. [JR]   1619 Patient reassess, back to baseline.  He has feeling better.  Plan to discharge home with follow up [JR]      ED Course User Index  [JR] Everton Soriano DO                           Clinical Impression:  Final diagnoses:  [R56.9] Seizure  [T14.90XA] Trauma  [Z78.9] Alcohol use  [S01.01XA] Laceration  of scalp, initial encounter  [G40.919] Breakthrough seizure (Primary)          ED Disposition Condition    Discharge Stable          ED Prescriptions       Medication Sig Dispense Start Date End Date Auth. Provider    levETIRAcetam (KEPPRA) 1000 MG tablet Take 1 tablet (1,000 mg total) by mouth 2 (two) times daily. 60 tablet 1/7/2025 2/6/2025 Everton Soriano DO          Follow-up Information       Follow up With Specialties Details Why Contact Info Additional Information    Dorinda Mckenzie MD Internal Medicine In 3 days  [address] [facility]  [address] [place] [id number] [phone]       [facility] -  Emergency Medicine  If symptoms worsen [address] [place] [address] [address] [place] [address] 1st floor             Everton Soriano DO  01/07/25 [id number]

## 2025-01-07 NOTE — DISCHARGE INSTRUCTIONS
RETURN TO EMERGENCY DEPARTMENT WITHOUT FAIL, IF YOUR SYMPTOMS WORSEN, IF YOU GET NEW OR DIFFERENT SYMPTOMS, IF YOU ARE UNABLE TO FOLLOW UP AS DIRECTED, OR IF YOU HAVE ANY CONCERNS OR WORRIES.    Neosporin twice daily to this staples.  Monitor for infection.  Staples will be removed in 10-14 days.  Increase your Keppra to 1000 mg twice daily.  Do not drink alcohol

## 2025-01-08 ENCOUNTER — TELEPHONE (OUTPATIENT)
Dept: NEUROLOGY | Facility: CLINIC | Age: 55
End: 2025-01-08
Payer: MEDICAID

## 2025-01-08 LAB
OHS QRS DURATION: 90 MS
OHS QTC CALCULATION: 418 MS

## 2025-01-08 NOTE — TELEPHONE ENCOUNTER
----- Message from Sierra sent at 1/8/2025  4:11 PM CST -----  Contact: Sister Suze  Type:  Sooner Appointment Request    Caller is requesting a sooner appointment.  Caller declined first available appointment listed below.  Caller will not accept being placed on the waitlist and is requesting a message be sent to doctor.    Name of Caller:  Patients sister Suze  When is the first available appointment?  N/A  Symptoms:  [facility] Ed f/u breakthrough Seizures, past stroke pt  Would the patient rather a call back or a response via [facility]? Call  Best Call Back Number:  [phone]   Additional Information:  Can we please call Suze back to discuss. Thank You

## 2025-01-10 LAB — LEVETIRACETAM SERPL-MCNC: 43.4 UG/ML (ref 3–60)

## 2025-01-18 ENCOUNTER — HOSPITAL ENCOUNTER (EMERGENCY)
Facility: HOSPITAL | Age: 55
Discharge: HOME | End: 2025-01-18
Attending: STUDENT IN AN ORGANIZED HEALTH CARE EDUCATION/TRAINING PROGRAM
Payer: MEDICAID

## 2025-01-18 VITALS
TEMPERATURE: 98 F | RESPIRATION RATE: 18 BRPM | HEART RATE: 77 BPM | BODY MASS INDEX: 24.2 KG/M2 | WEIGHT: 169 LBS | HEIGHT: 70 IN | SYSTOLIC BLOOD PRESSURE: 116 MMHG | OXYGEN SATURATION: 96 % | DIASTOLIC BLOOD PRESSURE: 57 MMHG

## 2025-01-18 DIAGNOSIS — Z48.02: ICD-10-CM

## 2025-01-18 DIAGNOSIS — Z51.89 VISIT FOR WOUND CHECK: Primary | ICD-10-CM

## 2025-01-18 PROCEDURE — 99999 HC NO LEVEL OF SERVICE - ED ONLY: CPT

## 2025-01-18 NOTE — DISCHARGE INSTRUCTIONS
Please return to emergency department if you have new or worsening symptoms.  Follow up with the primary care doctor as needed for wound recheck.

## 2025-01-18 NOTE — ED PROVIDER NOTES
Encounter Date: 1/18/2025       History     Chief Complaint   Patient presents with    Suture / Staple Removal     HPI    Brannon Severino is a 54 y.o. male with a past medical history of COPD, hypertension, diabetes, pancreatitis, seizure disorder, and previous stroke that presented for staple removal.  On 01/07, he had a breakthrough seizure that resulted in him having a laceration on the back of his head.  Patient had 5 staples placed in his laceration at that time.  Been doing well since then.  Putting Neosporin on the wound.  No bleeding or drainage noted.    Review of patient's allergies indicates:  No Known Allergies  Past Medical History:   Diagnosis Date    COPD (chronic obstructive pulmonary disease)     Diabetes mellitus     Hypertension     Pancreatitis     Psoriasis     Stroke      Past Surgical History:   Procedure Laterality Date    COLONOSCOPY N/A 1/11/2023    Procedure: COLONOSCOPY;  Surgeon: Kermit Maradiaga MD;  Location: [facility];  Service: Endoscopy;  Laterality: N/A;    TENDON RELEASE Right     right arm tendon repair and release      Family History   Problem Relation Name Age of Onset    Heart disease Mother      Diabetes Mother      Heart disease Father          CABG     Social History     Tobacco Use    Smoking status: Every Day     Current packs/day: 0.40     Average packs/day: 0.4 packs/day for 10.2 years (4.1 ttl pk-yrs)     Types: Cigarettes     Start date: 10/31/2014   Substance Use Topics    Alcohol use: Not Currently     Comment: vodka 0.5,  2-6 beers a day    Drug use: Not Currently     Review of Systems   Skin:  Positive for wound.   All other systems reviewed and are negative.      Physical Exam     Initial Vitals   BP Pulse Resp Temp SpO2   01/18/25 1119 01/18/25 1121 01/18/25 1119 01/18/25 1119 01/18/25 1119   (!) 116/57 77 18 98 °F (36.7 °C) 98 %      MAP       --                Physical Exam    Nursing note and vitals reviewed.  Constitutional: He appears well-developed  and well-nourished.   HENT:   Head: Normocephalic.   Well healing stellate laceration on the occiput.  Five staples in place.   Eyes: EOM are normal. Pupils are equal, round, and reactive to light.   Neck:   Normal range of motion.  Cardiovascular:  Normal rate and regular rhythm.           Pulmonary/Chest: Breath sounds normal. No respiratory distress.   Abdominal: Abdomen is soft. He exhibits no distension. There is no abdominal tenderness. There is no rebound.   Musculoskeletal:         General: Normal range of motion.      Cervical back: Normal range of motion.     Neurological: He is alert and oriented to person, place, and time. GCS eye subscore is 4. GCS verbal subscore is 5. GCS motor subscore is 6.   Skin: Capillary refill takes less than 2 seconds.   Psychiatric: He has a normal mood and affect.         ED Course   Suture Removal    Date/Time: 1/18/2025 11:17 AM  Location procedure was performed: [facility] EMERGENCY DEPARTMENT    Performed by: Ignacio Olivares MD  Authorized by: Ignacio Olivares MD  Body area: head/neck  Location details: scalp  Wound Appearance: clean and well healed  Sutures Removed: 0  Staples Removed: 5  Post-removal: no dressing applied  Complications: No  Specimens: No  Implants: No        Labs Reviewed - No data to display       Imaging Results    None          Medications - No data to display  Medical Decision Making  54-year-old male presenting for staple removal following a head laceration in the setting of a seizure.  Has been well since initial presentation.  Wound appears to be well healing.  No evidence of infection such as drainage, erythema, crepitus, or tenderness.  Staples were removed as described above.  Stable for discharge.  Return precautions given.  Instructed follow up with primary care.                                      Clinical Impression:  Final diagnoses:  [Z51.89] Visit for wound check (Primary)  [Z48.02] Encounter for removal of staples           ED Disposition Condition    Discharge Stable          ED Prescriptions    None       Follow-up Information       Follow up With Specialties Details Why Contact Info Additional Information    [facility] - ED Emergency Medicine  As needed, If symptoms worsen [address] [place] [address] [address] [place] [address] 1st floor             Ignacio Olivares MD  01/18/25 5564

## 2025-02-20 ENCOUNTER — TELEPHONE (OUTPATIENT)
Dept: NEUROLOGY | Facility: CLINIC | Age: 55
End: 2025-02-20
Payer: MEDICAID

## 2025-02-20 NOTE — TELEPHONE ENCOUNTER
----- Message from Chantale sent at 2/20/2025  4:00 PM CST -----  Contact: PT  Type:  Needs Medical AdviceWho Called: PT Symptoms (please be specific): PT WOULD LIKE TO SPEAK TO THE PROVIDER TO INTRODUCE HIMSELF  How long has patient had these symptoms:  N/APharmacy name and phone #: N/AWould the patient rather a call back or a response via [facility]? CALL Best Call Back Number: [phone] Information: THANK YOU

## 2025-02-20 NOTE — TELEPHONE ENCOUNTER
Spoke to pt. Patient states he wanted to speak with Dr. Mcneill before he come to his appointment. He states he like to know who he's coming to see and is very particular about his doctors. Explained to him that he can speak to Dr. Mcneill and introduce himself at his upcoming appointment we have him scheduled for on 2/26/25 at 2:00pm. He verbalized understanding.

## 2025-02-26 ENCOUNTER — OFFICE VISIT (OUTPATIENT)
Dept: NEUROLOGY | Facility: CLINIC | Age: 55
End: 2025-02-26
Payer: MEDICAID

## 2025-02-26 VITALS
SYSTOLIC BLOOD PRESSURE: 145 MMHG | HEIGHT: 70 IN | DIASTOLIC BLOOD PRESSURE: 86 MMHG | WEIGHT: 173.75 LBS | BODY MASS INDEX: 24.87 KG/M2 | HEART RATE: 95 BPM

## 2025-02-26 DIAGNOSIS — E78.5 HYPERLIPIDEMIA, UNSPECIFIED HYPERLIPIDEMIA TYPE: ICD-10-CM

## 2025-02-26 DIAGNOSIS — R56.9 SEIZURE: Primary | ICD-10-CM

## 2025-02-26 DIAGNOSIS — Z86.73 CHRONIC ARTERIAL ISCHEMIC STROKE: ICD-10-CM

## 2025-02-26 DIAGNOSIS — F10.10 ALCOHOL ABUSE, DAILY USE: ICD-10-CM

## 2025-02-26 DIAGNOSIS — I10 HYPERTENSION, UNSPECIFIED TYPE: ICD-10-CM

## 2025-02-26 PROCEDURE — 3077F SYST BP >= 140 MM HG: CPT | Mod: CPTII,,, | Performed by: PSYCHIATRY & NEUROLOGY

## 2025-02-26 PROCEDURE — 1159F MED LIST DOCD IN RCRD: CPT | Mod: CPTII,,, | Performed by: PSYCHIATRY & NEUROLOGY

## 2025-02-26 PROCEDURE — 99205 OFFICE O/P NEW HI 60 MIN: CPT | Mod: S$PBB,,, | Performed by: PSYCHIATRY & NEUROLOGY

## 2025-02-26 PROCEDURE — 3008F BODY MASS INDEX DOCD: CPT | Mod: CPTII,,, | Performed by: PSYCHIATRY & NEUROLOGY

## 2025-02-26 PROCEDURE — G2211 COMPLEX E/M VISIT ADD ON: HCPCS | Mod: S$PBB,,, | Performed by: PSYCHIATRY & NEUROLOGY

## 2025-02-26 PROCEDURE — 1160F RVW MEDS BY RX/DR IN RCRD: CPT | Mod: CPTII,,, | Performed by: PSYCHIATRY & NEUROLOGY

## 2025-02-26 PROCEDURE — 99999 PR PBB SHADOW E&M-EST. PATIENT-LVL IV: CPT | Mod: PBBFAC,,, | Performed by: PSYCHIATRY & NEUROLOGY

## 2025-02-26 PROCEDURE — 99214 OFFICE O/P EST MOD 30 MIN: CPT | Mod: PBBFAC,PO | Performed by: PSYCHIATRY & NEUROLOGY

## 2025-02-26 PROCEDURE — 3079F DIAST BP 80-89 MM HG: CPT | Mod: CPTII,,, | Performed by: PSYCHIATRY & NEUROLOGY

## 2025-02-26 RX ORDER — LEVETIRACETAM 1000 MG/1
2000 TABLET ORAL 2 TIMES DAILY
Qty: 120 TABLET | Refills: 11 | Status: SHIPPED | OUTPATIENT
Start: 2025-02-26 | End: 2026-02-21

## 2025-02-26 RX ORDER — CITALOPRAM 40 MG/1
40 TABLET, FILM COATED ORAL DAILY
COMMUNITY

## 2025-02-26 RX ORDER — TRAZODONE HYDROCHLORIDE 50 MG/1
50 TABLET ORAL NIGHTLY
COMMUNITY

## 2025-02-26 RX ORDER — EMPAGLIFLOZIN AND METFORMIN HYDROCHLORIDE 12.5; 1 MG/1; MG/1
1 TABLET ORAL 2 TIMES DAILY
COMMUNITY

## 2025-02-26 RX ORDER — CETIRIZINE HYDROCHLORIDE 10 MG/1
10 TABLET ORAL DAILY
COMMUNITY

## 2025-02-26 NOTE — PROGRESS NOTES
Date: 2/26/2025    Patient ID: Brannon Severino is a 54 y.o. male.    Referring Provider:  Self, Aaareferral    Chief Complaint: Seizures and Establish Care      History of Present Illness:  Mr. Severino is a 54 y.o. male who presents referred by Self, Aaareferral today for evaluation of seizure. The patient was accompanied by sister who also contributed to the following history.     He was seen in the ER on 1/7/2025 for a breakthrough seizure. Last before that was over a year before. He will feel tingling and have cold sweats. He recalls his left arm raising up and then he does not remember anything after that. He had a head injury and thinks he might have hit it on the bed frame or floor. He reported compliance with keppra but the ER doctor note indicates a decreased dose prior to this seizure. Keppra level was 43.4.     The ER doctor notes his dose was decreased from 1000 mg to 500 mg at night. The ER increased it back to 1000 mg BID. However, patient notes that is incorrect. He has been on 2000 mg BID but was dropped down to 2000 mg in the AM and 1000 mg in the PM. This was dropped down because insurance did not want to cover 2000 mg BID. He has just continued on keppra 2000 mg in the AM and 1000 mg in the PM.     Risk factors: He has history of right MCA stroke in 2014 with residual left arm weakness. He had bilateral CEA for carotid stenosis. He is on aspirin and plavix. Lipitor for cholesterol control. Synjardy for diabetes. He still smokes.  On HCTZ, amlodipine, metoprolol for BP.     He drinks alcohol daily. He does not drive any more. He lives with his dad.     Allergies:  Review of patient's allergies indicates:   Allergen Reactions    Lisinopril Swelling       Current Medications:  Current Medications[1]    Past Medical History:  Past Medical History:   Diagnosis Date    COPD (chronic obstructive pulmonary disease)     Diabetes mellitus     Hypertension     Pancreatitis     Psoriasis     Stroke   "      Past Surgical History:  Past Surgical History:   Procedure Laterality Date    COLONOSCOPY N/A 1/11/2023    Procedure: COLONOSCOPY;  Surgeon: Kermit Maradiaga MD;  Location: [facility];  Service: Endoscopy;  Laterality: N/A;    TENDON RELEASE Right     right arm tendon repair and release        Family History:  family history includes Diabetes in his mother; Heart disease in his father and mother.    Social History:   reports that he has been smoking cigarettes. He started smoking about 10 years ago. He has a 4.1 pack-year smoking history. He does not have any smokeless tobacco history on file. He reports that he does not currently use alcohol. He reports that he does not currently use drugs.    Physical Exam:  Vitals:    02/26/25 1403   BP: (!) 145/86   Pulse: 95   Weight: 78.8 kg (173 lb 11.6 oz)   Height: 5' 10" (1.778 m)   PainSc: 0-No pain     Body mass index is 24.93 kg/m².    Neurological Exam:  Mental status: Awake, alert.  Speech/Language: No dysarthria or aphasia on conversation.   Cranial nerves: Pupils equal round and reactive to light, extraocular movements intact, facial strength and sensation intact bilaterally, tongue midline, hearing grossly intact bilaterally. Shoulder shrug asymmetric on left.   Motor: 5 out of 5 strength throughout the upper and lower extremities on the right, 4-4+/5 on the left in UMN pattern.   Sensation: Intact to light touch and vibration bilaterally.  DTR: 2+ at the knees and biceps bilaterally.  Coordination: difficulty with finger tapping on left    Data:  I have personally reviewed the referring provider's notes, labs, & imaging made available to me today.     Labs:  CBC:   Lab Results   Component Value Date    WBC 14.74 (H) 01/07/2025    HGB 14.2 01/07/2025    HCT 42.5 01/07/2025     01/07/2025    MCV 82 01/07/2025    RDW 15.9 (H) 01/07/2025     BMP:   Lab Results   Component Value Date     01/07/2025    K 5.1 01/07/2025     01/07/2025    CO2 23 " 01/07/2025    BUN 11 01/07/2025    CREATININE 1.2 01/07/2025     (H) 01/07/2025    CALCIUM 9.9 01/07/2025    MG 2.1 01/07/2025    PHOS 2.9 10/11/2014     LFTS;   Lab Results   Component Value Date    PROT 7.8 01/07/2025    ALBUMIN 4.2 01/07/2025    BILITOT 0.4 01/07/2025    AST 23 01/07/2025    ALKPHOS 113 01/07/2025    ALT 16 01/07/2025     COAGS:   Lab Results   Component Value Date    INR 0.9 07/07/2014     FLP:   Lab Results   Component Value Date    CHOL 94 (L) 07/10/2014    HDL 22 (L) 07/10/2014    LDLCALC 44.4 (L) 07/10/2014    TRIG 138 07/10/2014    CHOLHDL 23.4 07/10/2014     EKG shows normal sinus rhythm    Imaging:  I have personally reviewed the imaging, CT head shows large area of right MCA territory encephalomalacia.     Assessment and Plan:  Mr. Severino is a 54 y.o. male referred to me by Self, Aaareferral for evaluation of seizures secondary to encephalomalacia from right MCA stroke. Will increase keppra back to 2000 mg BID as he had a seizure while on 2000mg in the AM and 1000 mg in the PM. He needs a higher dose for seizure prevention. He does not drive any longer.     Discussed the importance of abstaining from alcohol and tobacco use. For seizure and stroke prevention. Continue asa and plavix, statin for LDL goal <70, BP control and glucose control per PCP.           Seizure  -     levETIRAcetam (KEPPRA) 1000 MG tablet; Take 2 tablets (2,000 mg total) by mouth 2 (two) times daily.  Dispense: 120 tablet; Refill: 11    Chronic arterial ischemic stroke    Alcohol abuse, daily use    Hypertension, unspecified type    Hyperlipidemia, unspecified hyperlipidemia type         Epilepsy is a high risk condition posing risk of injury and harm. Visit today is associated with current or anticipated ongoing medical care related to this patient's single serious condition/complex condition (seizure, stroke). Plan to followup in 6 months for ongoing management.        [1]   Current Outpatient Medications    Medication Sig Dispense Refill    albuterol (PROAIR HFA) 90 mcg/actuation inhaler ProAir HFA 90 mcg/actuation aerosol inhaler  2 puffs every 4 hours as  needed for wheeze and shortness of breath      amLODIPine (NORVASC) 5 MG tablet Take 1 tablet (5 mg total) by mouth once daily. (Patient taking differently: Take 10 mg by mouth once daily.) 30 tablet 0    atorvastatin (LIPITOR) 80 MG tablet Take 1 tablet (80 mg total) by mouth every evening. 30 tablet 0    calcipotriene (DOVONOX) 0.005 % cream Apply twice daily to affected area of skin with steroid cream.      cetirizine (ZYRTEC) 10 MG tablet Take 10 mg by mouth once daily.      citalopram (CELEXA) 40 MG tablet Take 40 mg by mouth once daily.      clobetasol 0.05% (TEMOVATE) 0.05 % Oint APPLY TO THICKEST PLAQUES ON ELBOWS AND LEGS UNDER OCCLUSION AT NIGHT WITH CLING WRAP      clopidogrel (PLAVIX) 75 mg tablet Take 1 tablet by mouth once daily.      diphenhydrAMINE (BENADRYL) 50 MG capsule Take 1 capsule (50 mg total) by mouth every 6 (six) hours as needed for Itching. 30 capsule 0    famotidine (PEPCID) 20 MG tablet Take 1 tablet (20 mg total) by mouth 2 (two) times daily. for 10 days 20 tablet 0    fluticasone (VERAMYST) 27.5 mcg/actuation nasal spray 2 sprays by Nasal route daily as needed for Rhinitis or Allergies.      metoprolol succinate (TOPROL-XL) 25 MG 24 hr tablet Take 1 tablet (25 mg total) by mouth once daily. 30 tablet 0    mirtazapine (REMERON) 15 MG tablet Take 1 tablet (15 mg total) by mouth every evening. (Patient taking differently: Take 30 mg by mouth every evening.) 30 tablet 0    SYNJARDY 12.5-1,000 mg Tab Take 1 tablet by mouth 2 (two) times daily.      traZODone (DESYREL) 50 MG tablet Take 50 mg by mouth every evening.      aspirin 81 MG Chew Take 1 tablet (81 mg total) by mouth once daily. 30 tablet 0    levETIRAcetam (KEPPRA) 1000 MG tablet Take 2 tablets (2,000 mg total) by mouth 2 (two) times daily. 120 tablet 11     No current  facility-administered medications for this visit.

## 2025-04-12 ENCOUNTER — HOSPITAL ENCOUNTER (EMERGENCY)
Facility: HOSPITAL | Age: 55
Discharge: HOME | End: 2025-04-12
Attending: EMERGENCY MEDICINE
Payer: MEDICAID

## 2025-04-12 VITALS
BODY MASS INDEX: 24.2 KG/M2 | RESPIRATION RATE: 17 BRPM | TEMPERATURE: 98 F | HEIGHT: 70 IN | HEART RATE: 68 BPM | WEIGHT: 169 LBS | DIASTOLIC BLOOD PRESSURE: 81 MMHG | SYSTOLIC BLOOD PRESSURE: 155 MMHG | OXYGEN SATURATION: 97 %

## 2025-04-12 DIAGNOSIS — S61.211A LACERATION OF LEFT INDEX FINGER WITHOUT FOREIGN BODY WITHOUT DAMAGE TO NAIL, INITIAL ENCOUNTER: Primary | ICD-10-CM

## 2025-04-12 PROCEDURE — 99282 EMERGENCY DEPT VISIT SF MDM: CPT

## 2025-04-12 RX ORDER — CEPHALEXIN 500 MG/1
500 CAPSULE ORAL 4 TIMES DAILY
Qty: 20 CAPSULE | Refills: 0 | Status: SHIPPED | OUTPATIENT
Start: 2025-04-12 | End: 2025-04-17

## 2025-04-12 NOTE — ED NOTES
Left hand index finger cleaned with wound cleanser, patted dry.  Telfa applied and secured with coban.  No bleeding noted.  Tolerated well.  Wound edges approximated.  No drainage/edema/erythema noted.

## 2025-04-13 NOTE — ED PROVIDER NOTES
Encounter Date: 4/12/2025       History     Chief Complaint   Patient presents with    Laceration     Laceration to left pointer finger from a saw.     Brannon Severino is a 54 y.o. male presenting for evaluation of laceration to his left index finger that occurred yesterday around noon.  He states he accidentally cut the finger, while trimming some hedges.  He wrapped it tightly to help stop the bleeding, because he does take blood thinners.  No numbness, tingling or weakness.  He thinks his tetanus is up-to-date.  He has a past medical history of COPD (chronic obstructive pulmonary disease), Diabetes mellitus, Hypertension, Pancreatitis, Psoriasis, and Stroke.      The history is provided by the patient.     Review of patient's allergies indicates:   Allergen Reactions    Lisinopril Swelling     Past Medical History:   Diagnosis Date    COPD (chronic obstructive pulmonary disease)     Diabetes mellitus     Hypertension     Pancreatitis     Psoriasis     Stroke      Past Surgical History:   Procedure Laterality Date    COLONOSCOPY N/A 1/11/2023    Procedure: COLONOSCOPY;  Surgeon: Kermit Maradiaga MD;  Location: [facility];  Service: Endoscopy;  Laterality: N/A;    TENDON RELEASE Right     right arm tendon repair and release      Family History   Problem Relation Name Age of Onset    Heart disease Mother      Diabetes Mother      Heart disease Father          CABG     Social History[1]  Review of Systems   Constitutional:  Negative for chills and fever.   Musculoskeletal:  Negative for arthralgias, back pain, joint swelling, myalgias, neck pain and neck stiffness.   Skin:  Positive for wound. Negative for color change, pallor and rash.   Neurological:  Negative for weakness and numbness.   Hematological:  Does not bruise/bleed easily.       Physical Exam     Initial Vitals [04/12/25 1459]   BP Pulse Resp Temp SpO2   (!) 143/100 90 17 97.8 °F (36.6 °C) 98 %      MAP       --         Physical Exam    Nursing note  and vitals reviewed.  Constitutional: He appears well-developed and well-nourished. He is not diaphoretic. No distress.   Cardiovascular:  Intact distal pulses.           Musculoskeletal:         General: No tenderness or edema. Normal range of motion.     Neurological: He is alert and oriented to person, place, and time. He has normal strength. No sensory deficit.   Skin: Skin is warm and dry. No rash and no abscess noted. No erythema.   1 cm v-shaped laceration noted to flexor surface of left index finger.  No visible foreign body.  No active bleeding or discharge.  No decreased range of motion, decreased strength or loss of sensation to left index finger.   Psychiatric: He has a normal mood and affect.         ED Course   Procedures  Labs Reviewed - No data to display       Imaging Results    None          Medications - No data to display  Medical Decision Making  Differential diagnosis:  Laceration  Tendon injury  Foreign body    Pt emergently evaluated here in the ED.    Laceration is greater than 24 hours old.  At this time, we will not repair of the laceration, given increased risk of infection.  Wound will be left to heal via secondary intention.  Prescription for Keflex is sent to pharmacy.  Tetanus is up-to-date.  He voices understanding and is agreeable with the plan.  He is given specific return precautions.    Risk  OTC drugs.  Prescription drug management.               ED Course as of 04/12/25 2027   Sat Apr 12, 2025   1620 Injury occurred greater than 24 hours ago.  Hemostasis and early healing noted on exam.  No indication for sutures at this time.  Wound will be left to heal via secondary intention.  [HS]   1621 Tetanus is up to date.  [HS]      ED Course User Index  [HS] Amber Martin, DUANE                           Clinical Impression:  Final diagnoses:  [S61.211A] Laceration of left index finger without foreign body without damage to nail, initial encounter (Primary)          ED  Disposition Condition    Discharge Stable          ED Prescriptions       Medication Sig Dispense Start Date End Date Auth. Provider    cephALEXin (KEFLEX) 500 MG capsule Take 1 capsule (500 mg total) by mouth 4 (four) times daily. for 5 days 20 capsule 4/12/2025 4/17/2025 Amber Martin PA-C          Follow-up Information       Follow up With Specialties Details Why Contact Info Additional Information    [facility] Emergency Medicine  As needed, If symptoms worsen [address] [place] [address] [address] [place] [address] 1st floor               [1]   Social History  Tobacco Use    Smoking status: Every Day     Current packs/day: 0.40     Average packs/day: 0.4 packs/day for 10.4 years (4.2 ttl pk-yrs)     Types: Cigarettes     Start date: 10/31/2014   Substance Use Topics    Alcohol use: Not Currently     Comment: vodka 0.5,  2-6 beers a day    Drug use: Not Currently        Amber Martin PA-C  04/12/25 2027

## 2025-04-14 ENCOUNTER — TELEPHONE (OUTPATIENT)
Facility: CLINIC | Age: 55
End: 2025-04-14
Payer: MEDICAID

## 2025-04-14 NOTE — TELEPHONE ENCOUNTER
----- Message from Ridge sent at 4/14/2025  9:44 AM CDT -----  Contact: Self  Type: Needs Medical AdviceWho Called:  PatientPharmacy name and phone #:  [facility] - [place] - [phone] [place] [address]Zqnic: [phone] Fax: [phone] Call Back Number: [phone]  Additional Information: States prescription for Keppra had incorrect qty, needs new script sent.

## 2025-04-14 NOTE — TELEPHONE ENCOUNTER
Pharmacist states they had two active prescriptions for pt for Keppra. Dr. Mcneill was 2 tabs twice daily as of 2/26/25 and script from PCP states 2 tabs in AM and 1 tab QHS and it had an incorrect quantity. She states she will discontinue incorrect prescription from PCP.

## 2025-07-28 DIAGNOSIS — F17.290 OTHER TOBACCO PRODUCT NICOTINE DEPENDENCE, UNCOMPLICATED: Primary | ICD-10-CM

## 2025-07-30 ENCOUNTER — HOSPITAL ENCOUNTER (OUTPATIENT)
Dept: RADIOLOGY | Facility: HOSPITAL | Age: 55
Discharge: HOME | End: 2025-07-30
Attending: INTERNAL MEDICINE
Payer: MEDICAID

## 2025-07-30 DIAGNOSIS — F17.290 OTHER TOBACCO PRODUCT NICOTINE DEPENDENCE, UNCOMPLICATED: ICD-10-CM

## 2025-07-30 PROCEDURE — 71271 CT THORAX LUNG CANCER SCR C-: CPT | Mod: 26,,, | Performed by: RADIOLOGY

## 2025-07-30 PROCEDURE — 71271 CT THORAX LUNG CANCER SCR C-: CPT | Mod: TC

## 2025-08-26 ENCOUNTER — TELEPHONE (OUTPATIENT)
Dept: NEUROLOGY | Facility: CLINIC | Age: 55
End: 2025-08-26
Payer: MEDICAID

## 2025-09-02 ENCOUNTER — OFFICE VISIT (OUTPATIENT)
Dept: NEUROLOGY | Facility: CLINIC | Age: 55
End: 2025-09-02
Payer: MEDICAID

## 2025-09-02 DIAGNOSIS — Z86.73 CHRONIC ARTERIAL ISCHEMIC STROKE: ICD-10-CM

## 2025-09-02 DIAGNOSIS — R56.9 SEIZURE: Primary | ICD-10-CM

## 2025-09-02 DIAGNOSIS — E78.5 HYPERLIPIDEMIA, UNSPECIFIED HYPERLIPIDEMIA TYPE: ICD-10-CM

## 2025-09-02 DIAGNOSIS — I10 HYPERTENSION, UNSPECIFIED TYPE: ICD-10-CM

## 2025-09-02 PROCEDURE — 1159F MED LIST DOCD IN RCRD: CPT | Mod: CPTII,95,, | Performed by: PSYCHIATRY & NEUROLOGY

## 2025-09-02 PROCEDURE — 1160F RVW MEDS BY RX/DR IN RCRD: CPT | Mod: CPTII,95,, | Performed by: PSYCHIATRY & NEUROLOGY

## 2025-09-02 PROCEDURE — G2211 COMPLEX E/M VISIT ADD ON: HCPCS | Mod: 95,,, | Performed by: PSYCHIATRY & NEUROLOGY

## 2025-09-02 PROCEDURE — 98006 SYNCH AUDIO-VIDEO EST MOD 30: CPT | Mod: 95,,, | Performed by: PSYCHIATRY & NEUROLOGY

## 2025-09-02 RX ORDER — LEVETIRACETAM 1000 MG/1
2000 TABLET ORAL 2 TIMES DAILY
Qty: 120 TABLET | Refills: 11 | Status: SHIPPED | OUTPATIENT
Start: 2025-09-02 | End: 2026-08-28